# Patient Record
Sex: MALE | Race: WHITE | NOT HISPANIC OR LATINO | Employment: OTHER | URBAN - METROPOLITAN AREA
[De-identification: names, ages, dates, MRNs, and addresses within clinical notes are randomized per-mention and may not be internally consistent; named-entity substitution may affect disease eponyms.]

---

## 2017-02-24 ENCOUNTER — HOME HEALTH ADMISSION (OUTPATIENT)
Dept: HOME HEALTH SERVICES | Facility: HOME HEALTHCARE | Age: 77
End: 2017-02-24
Payer: MEDICARE

## 2017-02-28 ENCOUNTER — HOME CARE VISIT (OUTPATIENT)
Dept: HOME HEALTH SERVICES | Facility: HOME HEALTHCARE | Age: 77
End: 2017-02-28

## 2017-02-28 ENCOUNTER — HOME CARE VISIT (OUTPATIENT)
Dept: HOME HEALTH SERVICES | Facility: HOME HEALTHCARE | Age: 77
End: 2017-02-28
Payer: MEDICARE

## 2017-02-28 VITALS
HEIGHT: 64 IN | HEART RATE: 62 BPM | DIASTOLIC BLOOD PRESSURE: 78 MMHG | RESPIRATION RATE: 18 BRPM | TEMPERATURE: 99.1 F | SYSTOLIC BLOOD PRESSURE: 138 MMHG

## 2017-02-28 PROCEDURE — 665999 HH PPS REVENUE DEBIT

## 2017-02-28 PROCEDURE — G0162 HHC RN E&M PLAN SVS, 15 MIN: HCPCS

## 2017-02-28 PROCEDURE — 665001 SOC-HOME HEALTH

## 2017-02-28 PROCEDURE — 665998 HH PPS REVENUE CREDIT

## 2017-02-28 SDOH — ECONOMIC STABILITY: HOUSING INSECURITY: UNSAFE COOKING RANGE AREA: 0

## 2017-02-28 SDOH — ECONOMIC STABILITY: HOUSING INSECURITY: UNSAFE APPLIANCES: 0

## 2017-02-28 ASSESSMENT — ACTIVITIES OF DAILY LIVING (ADL)
TRANSPORTATION COMMENTS: POTENTIAL FOR FALLS
OASIS_M1830: 01
HOME_HEALTH_OASIS: 01
HOME_HEALTH_OASIS: 00

## 2017-02-28 ASSESSMENT — PATIENT HEALTH QUESTIONNAIRE - PHQ9
2. FEELING DOWN, DEPRESSED, IRRITABLE, OR HOPELESS: 00
1. LITTLE INTEREST OR PLEASURE IN DOING THINGS: 00

## 2017-03-01 PROCEDURE — 665999 HH PPS REVENUE DEBIT

## 2017-03-01 PROCEDURE — 665998 HH PPS REVENUE CREDIT

## 2017-03-02 ENCOUNTER — HOME CARE VISIT (OUTPATIENT)
Dept: HOME HEALTH SERVICES | Facility: HOME HEALTHCARE | Age: 77
End: 2017-03-02
Payer: MEDICARE

## 2017-03-02 PROCEDURE — 665999 HH PPS REVENUE DEBIT

## 2017-03-02 PROCEDURE — A6212 FOAM DRG <=16 SQ IN W/BORDER: HCPCS

## 2017-03-02 PROCEDURE — 665998 HH PPS REVENUE CREDIT

## 2017-03-02 PROCEDURE — G0493 RN CARE EA 15 MIN HH/HOSPICE: HCPCS

## 2017-03-02 PROCEDURE — A4216 STERILE WATER/SALINE, 10 ML: HCPCS

## 2017-03-02 ASSESSMENT — ENCOUNTER SYMPTOMS
VOMITING: PT DENIES
NAUSEA: PT DENIES

## 2017-03-03 ENCOUNTER — HOME CARE VISIT (OUTPATIENT)
Dept: HOME HEALTH SERVICES | Facility: HOME HEALTHCARE | Age: 77
End: 2017-03-03
Payer: MEDICARE

## 2017-03-03 VITALS
HEART RATE: 40 BPM | TEMPERATURE: 99.9 F | DIASTOLIC BLOOD PRESSURE: 60 MMHG | SYSTOLIC BLOOD PRESSURE: 120 MMHG | RESPIRATION RATE: 16 BRPM

## 2017-03-03 PROCEDURE — 665998 HH PPS REVENUE CREDIT

## 2017-03-03 PROCEDURE — 665999 HH PPS REVENUE DEBIT

## 2017-03-03 ASSESSMENT — ENCOUNTER SYMPTOMS: DEBILITATING PAIN: 1

## 2017-03-04 PROCEDURE — 665999 HH PPS REVENUE DEBIT

## 2017-03-04 PROCEDURE — 665998 HH PPS REVENUE CREDIT

## 2017-03-05 PROCEDURE — 665998 HH PPS REVENUE CREDIT

## 2017-03-05 PROCEDURE — 665999 HH PPS REVENUE DEBIT

## 2017-03-06 PROCEDURE — 665999 HH PPS REVENUE DEBIT

## 2017-03-06 PROCEDURE — 665998 HH PPS REVENUE CREDIT

## 2017-03-07 ENCOUNTER — HOME CARE VISIT (OUTPATIENT)
Dept: HOME HEALTH SERVICES | Facility: HOME HEALTHCARE | Age: 77
End: 2017-03-07
Payer: MEDICARE

## 2017-03-07 VITALS
DIASTOLIC BLOOD PRESSURE: 60 MMHG | TEMPERATURE: 98.8 F | RESPIRATION RATE: 16 BRPM | SYSTOLIC BLOOD PRESSURE: 128 MMHG | HEART RATE: 55 BPM

## 2017-03-07 PROCEDURE — 665998 HH PPS REVENUE CREDIT

## 2017-03-07 PROCEDURE — 665999 HH PPS REVENUE DEBIT

## 2017-03-07 PROCEDURE — G0162 HHC RN E&M PLAN SVS, 15 MIN: HCPCS

## 2017-03-07 ASSESSMENT — ENCOUNTER SYMPTOMS
NAUSEA: PT DENIES
VOMITING: PT DENIES

## 2017-03-08 PROCEDURE — 665998 HH PPS REVENUE CREDIT

## 2017-03-08 PROCEDURE — 665999 HH PPS REVENUE DEBIT

## 2017-03-09 PROCEDURE — 665998 HH PPS REVENUE CREDIT

## 2017-03-09 PROCEDURE — 665999 HH PPS REVENUE DEBIT

## 2017-03-10 ENCOUNTER — HOME CARE VISIT (OUTPATIENT)
Dept: HOME HEALTH SERVICES | Facility: HOME HEALTHCARE | Age: 77
End: 2017-03-10
Payer: MEDICARE

## 2017-03-10 PROCEDURE — G0151 HHCP-SERV OF PT,EA 15 MIN: HCPCS

## 2017-03-10 PROCEDURE — 665999 HH PPS REVENUE DEBIT

## 2017-03-10 PROCEDURE — 665998 HH PPS REVENUE CREDIT

## 2017-03-10 PROCEDURE — G0495 RN CARE TRAIN/EDU IN HH: HCPCS

## 2017-03-11 VITALS
SYSTOLIC BLOOD PRESSURE: 130 MMHG | HEART RATE: 81 BPM | DIASTOLIC BLOOD PRESSURE: 60 MMHG | RESPIRATION RATE: 18 BRPM | TEMPERATURE: 98.8 F

## 2017-03-11 PROCEDURE — 665999 HH PPS REVENUE DEBIT

## 2017-03-11 PROCEDURE — 665998 HH PPS REVENUE CREDIT

## 2017-03-11 SDOH — ECONOMIC STABILITY: HOUSING INSECURITY: UNSAFE APPLIANCES: 0

## 2017-03-11 SDOH — ECONOMIC STABILITY: HOUSING INSECURITY: UNSAFE COOKING RANGE AREA: 0

## 2017-03-11 ASSESSMENT — ACTIVITIES OF DAILY LIVING (ADL): ADLS_COMMENTS: <!--EPICS-->SEE OT EVAL<!--EPICE-->

## 2017-03-11 ASSESSMENT — ENCOUNTER SYMPTOMS: DEBILITATING PAIN: 1

## 2017-03-12 VITALS
SYSTOLIC BLOOD PRESSURE: 130 MMHG | HEART RATE: 81 BPM | RESPIRATION RATE: 18 BRPM | TEMPERATURE: 98.8 F | DIASTOLIC BLOOD PRESSURE: 60 MMHG

## 2017-03-12 PROCEDURE — 665999 HH PPS REVENUE DEBIT

## 2017-03-12 PROCEDURE — 665998 HH PPS REVENUE CREDIT

## 2017-03-12 ASSESSMENT — ENCOUNTER SYMPTOMS
DEBILITATING PAIN: 1
RESPIRATORY SYMPTOMS COMMENTS: NO S/S OF RESP DISTRESS

## 2017-03-13 PROCEDURE — 665999 HH PPS REVENUE DEBIT

## 2017-03-13 PROCEDURE — 665998 HH PPS REVENUE CREDIT

## 2017-03-14 ENCOUNTER — HOME CARE VISIT (OUTPATIENT)
Dept: HOME HEALTH SERVICES | Facility: HOME HEALTHCARE | Age: 77
End: 2017-03-14
Payer: MEDICARE

## 2017-03-14 VITALS
SYSTOLIC BLOOD PRESSURE: 142 MMHG | HEART RATE: 80 BPM | DIASTOLIC BLOOD PRESSURE: 62 MMHG | TEMPERATURE: 210.4 F | RESPIRATION RATE: 16 BRPM

## 2017-03-14 PROCEDURE — G0299 HHS/HOSPICE OF RN EA 15 MIN: HCPCS

## 2017-03-14 PROCEDURE — A6212 FOAM DRG <=16 SQ IN W/BORDER: HCPCS

## 2017-03-14 PROCEDURE — 665999 HH PPS REVENUE DEBIT

## 2017-03-14 PROCEDURE — 665998 HH PPS REVENUE CREDIT

## 2017-03-14 ASSESSMENT — ENCOUNTER SYMPTOMS
VOMITING: PT DENIES
DEBILITATING PAIN: 1
NAUSEA: PT DENIES

## 2017-03-15 PROCEDURE — 665999 HH PPS REVENUE DEBIT

## 2017-03-15 PROCEDURE — 665998 HH PPS REVENUE CREDIT

## 2017-03-16 PROCEDURE — 665999 HH PPS REVENUE DEBIT

## 2017-03-16 PROCEDURE — 665998 HH PPS REVENUE CREDIT

## 2017-03-17 ENCOUNTER — HOME CARE VISIT (OUTPATIENT)
Dept: HOME HEALTH SERVICES | Facility: HOME HEALTHCARE | Age: 77
End: 2017-03-17
Payer: MEDICARE

## 2017-03-17 PROCEDURE — 665998 HH PPS REVENUE CREDIT

## 2017-03-17 PROCEDURE — G0299 HHS/HOSPICE OF RN EA 15 MIN: HCPCS

## 2017-03-17 PROCEDURE — 665999 HH PPS REVENUE DEBIT

## 2017-03-18 PROCEDURE — 665998 HH PPS REVENUE CREDIT

## 2017-03-18 PROCEDURE — 665999 HH PPS REVENUE DEBIT

## 2017-03-19 VITALS
DIASTOLIC BLOOD PRESSURE: 70 MMHG | SYSTOLIC BLOOD PRESSURE: 110 MMHG | TEMPERATURE: 98.3 F | RESPIRATION RATE: 18 BRPM | HEART RATE: 76 BPM

## 2017-03-19 PROCEDURE — 665999 HH PPS REVENUE DEBIT

## 2017-03-19 PROCEDURE — 665998 HH PPS REVENUE CREDIT

## 2017-03-19 ASSESSMENT — ENCOUNTER SYMPTOMS
RESPIRATORY SYMPTOMS COMMENTS: NO S/S OF RESP DISTRESS
ADDITIONAL INFORMATION: BACK SURGERY
DEBILITATING PAIN: 1

## 2017-03-20 PROCEDURE — 665998 HH PPS REVENUE CREDIT

## 2017-03-20 PROCEDURE — 665999 HH PPS REVENUE DEBIT

## 2017-03-21 ENCOUNTER — OFFICE VISIT (OUTPATIENT)
Dept: CARDIOLOGY | Facility: MEDICAL CENTER | Age: 77
End: 2017-03-21
Payer: MEDICARE

## 2017-03-21 VITALS
BODY MASS INDEX: 18.61 KG/M2 | SYSTOLIC BLOOD PRESSURE: 136 MMHG | HEART RATE: 76 BPM | HEIGHT: 64 IN | OXYGEN SATURATION: 84 % | WEIGHT: 109 LBS | DIASTOLIC BLOOD PRESSURE: 70 MMHG

## 2017-03-21 DIAGNOSIS — R55 NEAR SYNCOPE: ICD-10-CM

## 2017-03-21 DIAGNOSIS — E78.5 HYPERLIPIDEMIA, UNSPECIFIED HYPERLIPIDEMIA TYPE: ICD-10-CM

## 2017-03-21 DIAGNOSIS — I26.99 OTHER PULMONARY EMBOLISM WITHOUT ACUTE COR PULMONALE, UNSPECIFIED CHRONICITY (HCC): ICD-10-CM

## 2017-03-21 LAB — EKG IMPRESSION: NORMAL

## 2017-03-21 PROCEDURE — G8419 CALC BMI OUT NRM PARAM NOF/U: HCPCS | Performed by: INTERNAL MEDICINE

## 2017-03-21 PROCEDURE — 1036F TOBACCO NON-USER: CPT | Performed by: INTERNAL MEDICINE

## 2017-03-21 PROCEDURE — 4040F PNEUMOC VAC/ADMIN/RCVD: CPT | Mod: 8P | Performed by: INTERNAL MEDICINE

## 2017-03-21 PROCEDURE — 1101F PT FALLS ASSESS-DOCD LE1/YR: CPT | Mod: 8P | Performed by: INTERNAL MEDICINE

## 2017-03-21 PROCEDURE — G8484 FLU IMMUNIZE NO ADMIN: HCPCS | Performed by: INTERNAL MEDICINE

## 2017-03-21 PROCEDURE — G8432 DEP SCR NOT DOC, RNG: HCPCS | Performed by: INTERNAL MEDICINE

## 2017-03-21 PROCEDURE — 665999 HH PPS REVENUE DEBIT

## 2017-03-21 PROCEDURE — 99204 OFFICE O/P NEW MOD 45 MIN: CPT | Performed by: INTERNAL MEDICINE

## 2017-03-21 PROCEDURE — 3017F COLORECTAL CA SCREEN DOC REV: CPT | Mod: 8P | Performed by: INTERNAL MEDICINE

## 2017-03-21 PROCEDURE — 93000 ELECTROCARDIOGRAM COMPLETE: CPT | Performed by: INTERNAL MEDICINE

## 2017-03-21 PROCEDURE — 665998 HH PPS REVENUE CREDIT

## 2017-03-21 RX ORDER — ATORVASTATIN CALCIUM 20 MG/1
20 TABLET, FILM COATED ORAL NIGHTLY
COMMUNITY

## 2017-03-21 RX ORDER — TIOTROPIUM BROMIDE 18 UG/1
18 CAPSULE ORAL; RESPIRATORY (INHALATION) DAILY
COMMUNITY

## 2017-03-21 ASSESSMENT — ENCOUNTER SYMPTOMS
HEADACHES: 0
ABDOMINAL PAIN: 0
FALLS: 0
PALPITATIONS: 1
SHORTNESS OF BREATH: 1
ORTHOPNEA: 0
DEPRESSION: 0
DIZZINESS: 1
LOSS OF CONSCIOUSNESS: 0
BRUISES/BLEEDS EASILY: 0
PND: 0

## 2017-03-21 NOTE — Clinical Note
[facility]   [address] [place] [address]  ANGELIKA Berger [address]  Phone: [phone]  Fax: [phone]              Dear Dr. Raymundo,     It was a pleasure to see your patient in Cardiology clinic. Please see my note from today attached below.     Sincerely,    Nikki Morataya MD      Dewey Burton  1940    Encounter Date: 3/21/2017    PROGRESS NOTE:  Subjective:   Dewey Burton is a 76 y.o. male with past medical history significant for hyperlipidemia who was recently admitted to [facility] and diagnosed with acute PE was referred to cardiology clinic due to ongoing presyncope.    Records from recent hospital admission at [facility]  were reviewed. Patient admitted for near syncope versus syncopal episode. He was down for a few hours before he went to the hospital. He was found to be in rhabdomyolysis. He was also found to have acute PE. Patient was also hypoxemic on admission however that spontaneously resolved. Workup included an echocardiogram and a carotid duplex which is detailed below. Also had mildly elevated troponin thought to be demand ischemia in the setting of acute PE.    Since his discharge patient states that he's had 3 other episodes of near syncope. His wife notes that he has been very dehydrated and does not drink sufficient fluids. Also of note he was recently started on Lasix because of lower extremity edema after his recent hospitalization. His lower extremity edema has now resolved. At baseline he has dyspnea on exertion with walking up one flight of stairs which is stable for him. He reports occasional palpitations about a couple times a month not associated with his dizziness. He denies any orthopnea or PND.    Past medical history  Hyperlipidemia  Acute PE  Mild carotid stenosis    History reviewed. No pertinent past surgical history.     History reviewed. No pertinent family history.  History   Smoking status   • Former Smoker -- 1.50  packs/day   • Types: Cigarettes   • Quit date: 12/01/2012   Smokeless tobacco   • Never Used     Patient smoked 1-1/2 packs per day for 58 years, quit 2012. Drinks about 3 cans of beer per day. No history of recreational drug use.    Allergies   Allergen Reactions   • Lorazepam      STATES PARANOIA, HALLUCINATIONS     Outpatient Encounter Prescriptions as of 3/21/2017   Medication Sig Dispense Refill   • atorvastatin (LIPITOR) 20 MG Tab Take 20 mg by mouth every evening.     • tiotropium (SPIRIVA) 18 MCG Cap Inhale 18 mcg by mouth every day.     • Chlorphen-Phenyleph-Ibuprofen (ADVIL ALLERGY & CONGESTION PO) Take 1 tablet by mouth 1 time daily as needed. congestion     • Oxycodone HCl 20 MG Tab Take 20 mg by mouth 5 Times a Day. pain     • apixaban (ELIQUIS) 5mg Tab Take 5 mg by mouth 2 Times a Day.     • meloxicam (MOBIC) 15 MG tablet Take 15 mg by mouth 1 time daily as needed for Moderate Pain.     • cetirizine (ZYRTEC) 10 MG Tab Take 10 mg by mouth every day.     • aspirin 81 MG tablet Take 81 mg by mouth every day.     • Ibuprofen-Diphenhydramine Cit (ADVIL PM) 200-38 MG Tab Take 3 Tabs by mouth every bedtime.     • omeprazole (PRILOSEC) 20 MG delayed-release capsule Take 20 mg by mouth 2 times a day.     • docusate sodium (COLACE) 100 MG Cap Take 100 mg by mouth 2 times a day. stool softener     • apixaban (ELIQUIS) 5mg Tab Take 5 mg by mouth 2 Times a Day.     • silver sulfADIAZINE (SSD) 1 % Cream Apply  to affected area(s) 2 Times a Day. to wounds on buttocks     • [DISCONTINUED] furosemide (LASIX) 20 MG Tab Take 20 mg by mouth every day.     • Oxycodone HCl 20 MG Tab Take 1 Tab by mouth 4 times a day as needed (PAIN).     • ondansetron (ZOFRAN) 4 MG Tab tablet Take 4 mg by mouth every 6 hours as needed for Nausea/Vomiting.     • tizanidine (ZANAFLEX) 6 MG capsule Take 6 mg by mouth 2 Times a Day.     • gabapentin (NEURONTIN) 300 MG Cap Take 300-600 mg by mouth 3 times a day.     • meloxicam (MOBIC) 7.5 MG  "Tab Take 7.5 mg by mouth every day.     • Oxycodone-Acetaminophen (PERCOCET-10)  MG Tab Take 1-2 Tabs by mouth every four hours as needed for Severe Pain.     • Dietary Management Product (GABADONE PO) Take  by mouth.     • ATORVASTATIN CALCIUM PO Take 20 mg by mouth every day.     • Tiotropium Bromide Monohydrate (SPIRIVA HANDIHALER INH) Inhale  by mouth.       No facility-administered encounter medications on file as of 3/21/2017.     Review of Systems   Constitutional: Negative for malaise/fatigue.   Respiratory: Positive for shortness of breath (with exertion).    Cardiovascular: Positive for palpitations. Negative for chest pain, orthopnea, leg swelling and PND.   Gastrointestinal: Negative for abdominal pain.   Musculoskeletal: Negative for falls.   Skin: Negative.    Neurological: Positive for dizziness. Negative for loss of consciousness and headaches.   Endo/Heme/Allergies: Does not bruise/bleed easily.   Psychiatric/Behavioral: Negative for depression.   All other systems reviewed and are negative.       Objective:   /70 mmHg  Pulse 76  Ht 1.626 m (5' 4.02\")  Wt 49.442 kg (109 lb)  BMI 18.70 kg/m2  SpO2 84%    Physical Exam   Constitutional: He is oriented to person, place, and time. No distress.   HENT:   Head: Normocephalic and atraumatic.   Eyes: Conjunctivae are normal.   Neck: Normal range of motion. Neck supple. No JVD present.   Cardiovascular: Normal rate, regular rhythm and normal heart sounds.  Exam reveals no gallop and no friction rub.    No murmur heard.  Pulmonary/Chest: Effort normal and breath sounds normal. No respiratory distress. He has no wheezes. He has no rales.   Abdominal: Soft. There is no tenderness.   Musculoskeletal: He exhibits no edema.   Neurological: He is alert and oriented to person, place, and time.   Skin: Skin is warm and dry. He is not diaphoretic.   Psychiatric: He has a normal mood and affect.   Nursing note and vitals reviewed.    Labs from . " [facility] reviewed. Initially CK was significantly elevated however close to normal at the time of discharge. Creatinine normal at the time of discharge.    Cardiac duplex report from [facility] reviewed. It showed mild atherosclerotic disease bilaterally.    Echocardiogram from [facility] during recent admission also reviewed. It showed an ejection fraction of 60-65% with mild diastolic dysfunction. No major valvular pathology noted.    ECG from today was reviewed. It showed normal sinus rhythm at 65 bpm, normal axis, normal intervals, no Q waves, no ST-T wave changes. ECG unchanged from baseline.      Assessment:     1. Near syncope  HOLTER MONITOR / EVENT RECORDER    EKG    EKG       Medical Decision Making:  Today's Assessment / Status / Plan:     Near syncope  Occasional palpitations    Patient's cardiac workup at [facility] was relatively unremarkable. He continues to have ongoing near syncope. I suspect this is likely related to the recent diagnosis of PE however SVT cannot be ruled out. I will proceed with obtaining an event monitor for further evaluation. In the meantime I have advised the patient to ensure that he is hydrated. I have discontinued his Lasix as I think his lower extremity edema was essentially dependent. I have also advised the patient to cut down on his alcohol intake to no more than one drink per day.    Lower extremity edema - likely dependent. Echocardiogram was relatively unremarkable with mild diastolic dysfunction. Patient was advised to elevate his legs as much as possible. And use compression stockings as needed.    Return to clinic in 3 months or prn.    Thank you for allowing me to participate in the care of this patient. Please do not hesitate to contact me with any questions.    Nikki Morataya MD  Cardiologist  [facility]      Basilio Raymundo M.D.  [address] [place] [address] [address]  [address]  [facility] [address]  VIA Facsimile: [phone]

## 2017-03-21 NOTE — MR AVS SNAPSHOT
"Dewey Burton   3/21/2017 1:20 PM   Office Visit   MRN: [id number]    Department:  [facility]   Dept Phone:  [phone]    Description:  Male : 1940   Provider:  Nikki Morataya M.D.           Reason for Visit     New Patient           Allergies as of 3/21/2017     Allergen Noted Reactions    Lorazepam 2017       STATES PARANOIA, HALLUCINATIONS      You were diagnosed with     Near syncope   [569438]         Vital Signs     Blood Pressure Pulse Height Weight Body Mass Index Oxygen Saturation    136/70 mmHg 76 1.626 m (5' 4.02\") 49.442 kg (109 lb) 18.70 kg/m2 84%    Smoking Status                   Former Smoker           Basic Information     Date Of Birth Sex Race Ethnicity Preferred Language    1940 Male White Non- English      Your appointments     Mar 22, 2017 To Be Determined   SN-HH-HOME VISIT with Melony De Jesus L.P.N.   [facility] (--)    [address]  [place] [address]   [phone]            Mar 29, 2017 To Be Determined   SN-HH-HOME VISIT with Madelyn Viera R.N.   [facility] (--)    [address]  [place] [address]   [phone]            Mar 30, 2017  7:30 AM   Wound New Evaluation with Vijaya Rutledge P.T.   [facility] ([address] [place] [address])    [address]  [place] [address]   [phone]            2017 To Be Determined   SN-HH-HOME VISIT with Madelyn Viera R.N.   [facility] (--)    [address]  [place] [address]   [phone]            2017 To Be Determined   SN-HH-HOME VISIT with Madelyn Viera R.N.   [facility] (--)    [address]  [place] [address]   [phone]            2017 To Be Determined   SN-HH-HOME VISIT with Madelyn Viera R.N.   [facility] (--)    [address]  [place] [address]   [phone]            2017 To Be Determined   SN-HH-HOME VISIT with Madelyn Viera R.N.   [facility] (--)    [address]  [place] [address]   [phone]              Health Maintenance     Patient has no pending health maintenance at this time      Results       Current Immunizations     No immunizations on file.      Below and/or attached are the medications your provider expects you to take. Review all of your home medications and newly ordered medications with your provider and/or pharmacist. Follow medication instructions as directed by your provider and/or pharmacist. Please keep your medication list with you and share with your provider. Update the information when medications are discontinued, doses are changed, or new medications (including over-the-counter products) are added; and carry medication information at all times in the event of emergency situations     Allergies:  LORAZEPAM - (reactions not documented)               Medications  Valid as of: March 21, 2017 -  2:39 PM    Generic Name Brand Name Tablet Size Instructions for use    Apixaban (Tab) ELIQUIS 5mg Take 5 mg by mouth 2 Times a Day.        Apixaban (Tab) ELIQUIS 5mg Take 5 mg by mouth 2 Times a Day.        Aspirin (Tab) aspirin 81 MG Take 81 mg by mouth every day.        Atorvastatin Calcium   Take 20 mg by mouth every day.        Atorvastatin Calcium (Tab) LIPITOR 20 MG Take 20 mg by mouth every evening.        Cetirizine HCl (Tab) ZYRTEC 10 MG Take 10 mg by mouth every day.        Chlorphen-Phenyleph-Ibuprofen   Take 1 tablet by mouth 1 time daily as needed. congestion        Dietary Management Product   Take  by mouth.        Docusate Sodium (Cap) COLACE 100 MG Take 100 mg by mouth 2 times a day. stool softener        Gabapentin (Cap) NEURONTIN 300 MG Take 300-600 mg by mouth 3 times a day.        Ibuprofen-Diphenhydramine Cit (Tab) Ibuprofen-Diphenhydramine Cit 200-38 MG Take 3 Tabs by mouth every bedtime.        Meloxicam (Tab) MOBIC 7.5 MG Take 7.5 mg by mouth every day.        Meloxicam (Tab) MOBIC 15 MG Take 15 mg by mouth 1 time daily as  needed for Moderate Pain.        Omeprazole (CAPSULE DELAYED RELEASE) PRILOSEC 20 MG Take 20 mg by mouth 2 times a day.        Ondansetron HCl (Tab) ZOFRAN 4 MG Take 4 mg by mouth every 6 hours as needed for Nausea/Vomiting.        OxyCODONE HCl (Tab) Oxycodone HCl 20 MG Take 1 Tab by mouth 4 times a day as needed (PAIN).        OxyCODONE HCl (Tab) Oxycodone HCl 20 MG Take 20 mg by mouth 5 Times a Day. pain        Oxycodone-Acetaminophen (Tab) PERCOCET-10  MG Take 1-2 Tabs by mouth every four hours as needed for Severe Pain.        Silver Sulfadiazine (Cream) SILVADENE 1 % Apply  to affected area(s) 2 Times a Day. to wounds on buttocks        Tiotropium Bromide Monohydrate   Inhale  by mouth.        Tiotropium Bromide Monohydrate (Cap) SPIRIVA 18 MCG Inhale 18 mcg by mouth every day.        TiZANidine HCl (Cap) ZANAFLEX 6 MG Take 6 mg by mouth 2 Times a Day.        .                 Medicines prescribed today were sent to:     [facility] [address] [place] [address] - [facility] & [address] [address]    Phone: [phone] Fax: [phone]    Open 24 Hours?: No      Medication refill instructions:       If your prescription bottle indicates you have medication refills left, it is not necessary to call your provider’s office. Please contact your pharmacy and they will refill your medication.    If your prescription bottle indicates you do not have any refills left, you may request refills at any time through one of the following ways: The online [employer] system (except Urgent Care), by calling your provider’s office, or by asking your pharmacy to contact your provider’s office with a refill request. Medication refills are processed only during regular business hours and may not be available until the next business day. Your provider may request additional information or to have a follow-up visit with you prior to refilling your medication.   *Please Note:  Medication refills are assigned a new Rx number when refilled electronically. Your pharmacy may indicate that no refills were authorized even though a new prescription for the same medication is available at the pharmacy. Please request the medicine by name with the pharmacy before contacting your provider for a refill.        Your To Do List     Future Labs/Procedures Complete By Expires    EKG  As directed 3/21/2018    HOLTER MONITOR / EVENT RECORDER  As directed 3/21/2018         Wellness[employer] Access Code: [email or web address]  Expires: 4/7/2017  1:21 PM    WellnessFX  A secure, online tool to manage your health information     [employer] WellnessFX® is a secure, online tool that connects you to your personalized health information from the privacy of your home -- day or night - making it very easy for you to manage your healthcare. Once the activation process is completed, you can even access your medical information using the WellnessFX pooja, which is available for free in the Apple Pooja store or Google Play store.     WellnessFX provides the following levels of access (as shown below):   My Chart Features   [facility] Doctor [facility]  Specialists [facility]  Urgent  Care Non-[facility]  Primary Care  Doctor   Email your healthcare team securely and privately 24/7 X X X    Manage appointments: schedule your next appointment; view details of past/upcoming appointments X      Request prescription refills. X      View recent personal medical records, including lab and immunizations X X X X   View health record, including health history, allergies, medications X X X X   Read reports about your outpatient visits, procedures, consult and ER notes X X X X   See your discharge summary, which is a recap of your hospital and/or ER visit that includes your diagnosis, lab results, and care plan. X X       How to register for WellnessFX:  1. Go to  [email or web address].  2. Click on the Sign Up Now box, which takes you to the New Member  Sign Up page. You will need to provide the following information:  a. Enter your [employer] Access Code exactly as it appears at the top of this page. (You will not need to use this code after you’ve completed the sign-up process. If you do not sign up before the expiration date, you must request a new code.)   b. Enter your date of birth.   c. Enter your home email address.   d. Click Submit, and follow the next screen’s instructions.  3. Create a [employer] ID. This will be your [employer] login ID and cannot be changed, so think of one that is secure and easy to remember.  4. Create a [employer] password. You can change your password at any time.  5. Enter your Password Reset Question and Answer. This can be used at a later time if you forget your password.   6. Enter your e-mail address. This allows you to receive e-mail notifications when new information is available in [employer].  7. Click Sign Up. You can now view your health information.    For assistance activating your [employer] account, call [phone]

## 2017-03-22 ENCOUNTER — OFFICE VISIT (OUTPATIENT)
Dept: WOUND CARE | Facility: MEDICAL CENTER | Age: 77
End: 2017-03-22
Attending: FAMILY MEDICINE
Payer: MEDICARE

## 2017-03-22 ENCOUNTER — HOME CARE VISIT (OUTPATIENT)
Dept: HOME HEALTH SERVICES | Facility: HOME HEALTHCARE | Age: 77
End: 2017-03-22
Payer: MEDICARE

## 2017-03-22 DIAGNOSIS — T14.8XXA NON-HEALING NON-SURGICAL WOUND: ICD-10-CM

## 2017-03-22 DIAGNOSIS — T30.0 BURN: ICD-10-CM

## 2017-03-22 PROCEDURE — G0496 LPN CARE TRAIN/EDU IN HH: HCPCS

## 2017-03-22 PROCEDURE — A6258 TRANSPARENT FILM >16<=48 IN: HCPCS

## 2017-03-22 PROCEDURE — A6402 STERILE GAUZE <= 16 SQ IN: HCPCS

## 2017-03-22 PROCEDURE — 16020 DRESS/DEBRID P-THICK BURN S: CPT

## 2017-03-22 PROCEDURE — 665998 HH PPS REVENUE CREDIT

## 2017-03-22 PROCEDURE — A6235 HYDROCOLLD DRG >16<=48 W/O B: HCPCS

## 2017-03-22 PROCEDURE — 665999 HH PPS REVENUE DEBIT

## 2017-03-22 PROCEDURE — 11042 DBRDMT SUBQ TIS 1ST 20SQCM/<: CPT | Performed by: FAMILY MEDICINE

## 2017-03-22 SDOH — ECONOMIC STABILITY: HOUSING INSECURITY: UNSAFE COOKING RANGE AREA: 0

## 2017-03-22 SDOH — ECONOMIC STABILITY: HOUSING INSECURITY: UNSAFE APPLIANCES: 0

## 2017-03-22 ASSESSMENT — ENCOUNTER SYMPTOMS: RESPIRATORY SYMPTOMS COMMENTS: PT LUNGS CLEAR IN ALL FIELDS, NO ADVANTAGEOUS SOUND NOTED.

## 2017-03-22 NOTE — PROGRESS NOTES
Subjective:   Dewey Burton is a 76 y.o. male with past medical history significant for hyperlipidemia who was recently admitted to [facility] and diagnosed with acute PE was referred to cardiology clinic due to ongoing presyncope.    Records from recent hospital admission at [facility]  were reviewed. Patient admitted for near syncope versus syncopal episode. He was down for a few hours before he went to the hospital. He was found to be in rhabdomyolysis. He was also found to have acute PE. Patient was also hypoxemic on admission however that spontaneously resolved. Workup included an echocardiogram and a carotid duplex which is detailed below. Also had mildly elevated troponin thought to be demand ischemia in the setting of acute PE.    Since his discharge patient states that he's had 3 other episodes of near syncope. His wife notes that he has been very dehydrated and does not drink sufficient fluids. Also of note he was recently started on Lasix because of lower extremity edema after his recent hospitalization. His lower extremity edema has now resolved. At baseline he has dyspnea on exertion with walking up one flight of stairs which is stable for him. He reports occasional palpitations about a couple times a month not associated with his dizziness. He denies any orthopnea or PND.    Past medical history  Hyperlipidemia  Acute PE  Mild carotid stenosis    History reviewed. No pertinent past surgical history.     History reviewed. No pertinent family history.  History   Smoking status   • Former Smoker -- 1.50 packs/day   • Types: Cigarettes   • Quit date: 12/01/2012   Smokeless tobacco   • Never Used     Patient smoked 1-1/2 packs per day for 58 years, quit 2012. Drinks about 3 cans of beer per day. No history of recreational drug use.    Allergies   Allergen Reactions   • Lorazepam      STATES PARANOIA, HALLUCINATIONS     Outpatient Encounter Prescriptions as of 3/21/2017   Medication Sig Dispense Refill   •  atorvastatin (LIPITOR) 20 MG Tab Take 20 mg by mouth every evening.     • tiotropium (SPIRIVA) 18 MCG Cap Inhale 18 mcg by mouth every day.     • Chlorphen-Phenyleph-Ibuprofen (ADVIL ALLERGY & CONGESTION PO) Take 1 tablet by mouth 1 time daily as needed. congestion     • Oxycodone HCl 20 MG Tab Take 20 mg by mouth 5 Times a Day. pain     • apixaban (ELIQUIS) 5mg Tab Take 5 mg by mouth 2 Times a Day.     • meloxicam (MOBIC) 15 MG tablet Take 15 mg by mouth 1 time daily as needed for Moderate Pain.     • cetirizine (ZYRTEC) 10 MG Tab Take 10 mg by mouth every day.     • aspirin 81 MG tablet Take 81 mg by mouth every day.     • Ibuprofen-Diphenhydramine Cit (ADVIL PM) 200-38 MG Tab Take 3 Tabs by mouth every bedtime.     • omeprazole (PRILOSEC) 20 MG delayed-release capsule Take 20 mg by mouth 2 times a day.     • docusate sodium (COLACE) 100 MG Cap Take 100 mg by mouth 2 times a day. stool softener     • apixaban (ELIQUIS) 5mg Tab Take 5 mg by mouth 2 Times a Day.     • silver sulfADIAZINE (SSD) 1 % Cream Apply  to affected area(s) 2 Times a Day. to wounds on buttocks     • [DISCONTINUED] furosemide (LASIX) 20 MG Tab Take 20 mg by mouth every day.     • Oxycodone HCl 20 MG Tab Take 1 Tab by mouth 4 times a day as needed (PAIN).     • ondansetron (ZOFRAN) 4 MG Tab tablet Take 4 mg by mouth every 6 hours as needed for Nausea/Vomiting.     • tizanidine (ZANAFLEX) 6 MG capsule Take 6 mg by mouth 2 Times a Day.     • gabapentin (NEURONTIN) 300 MG Cap Take 300-600 mg by mouth 3 times a day.     • meloxicam (MOBIC) 7.5 MG Tab Take 7.5 mg by mouth every day.     • Oxycodone-Acetaminophen (PERCOCET-10)  MG Tab Take 1-2 Tabs by mouth every four hours as needed for Severe Pain.     • Dietary Management Product (GABADONE PO) Take  by mouth.     • ATORVASTATIN CALCIUM PO Take 20 mg by mouth every day.     • Tiotropium Bromide Monohydrate (SPIRIVA HANDIHALER INH) Inhale  by mouth.       No facility-administered encounter  "medications on file as of 3/21/2017.     Review of Systems   Constitutional: Negative for malaise/fatigue.   Respiratory: Positive for shortness of breath (with exertion).    Cardiovascular: Positive for palpitations. Negative for chest pain, orthopnea, leg swelling and PND.   Gastrointestinal: Negative for abdominal pain.   Musculoskeletal: Negative for falls.   Skin: Negative.    Neurological: Positive for dizziness. Negative for loss of consciousness and headaches.   Endo/Heme/Allergies: Does not bruise/bleed easily.   Psychiatric/Behavioral: Negative for depression.   All other systems reviewed and are negative.       Objective:   /70 mmHg  Pulse 76  Ht 1.626 m (5' 4.02\")  Wt 49.442 kg (109 lb)  BMI 18.70 kg/m2  SpO2 84%    Physical Exam   Constitutional: He is oriented to person, place, and time. No distress.   HENT:   Head: Normocephalic and atraumatic.   Eyes: Conjunctivae are normal.   Neck: Normal range of motion. Neck supple. No JVD present.   Cardiovascular: Normal rate, regular rhythm and normal heart sounds.  Exam reveals no gallop and no friction rub.    No murmur heard.  Pulmonary/Chest: Effort normal and breath sounds normal. No respiratory distress. He has no wheezes. He has no rales.   Abdominal: Soft. There is no tenderness.   Musculoskeletal: He exhibits no edema.   Neurological: He is alert and oriented to person, place, and time.   Skin: Skin is warm and dry. He is not diaphoretic.   Psychiatric: He has a normal mood and affect.   Nursing note and vitals reviewed.    Labs from [facility] reviewed. Initially CK was significantly elevated however close to normal at the time of discharge. Creatinine normal at the time of discharge.    Cardiac duplex report from [facility] reviewed. It showed mild atherosclerotic disease bilaterally.    Echocardiogram from [facility] during recent admission also reviewed. It showed an ejection fraction of 60-65% with mild diastolic dysfunction. No major " valvular pathology noted.    ECG from today was reviewed. It showed normal sinus rhythm at 65 bpm, normal axis, normal intervals, no Q waves, no ST-T wave changes. ECG unchanged from baseline.      Assessment:     1. Near syncope  HOLTER MONITOR / EVENT RECORDER    EKG    EKG       Medical Decision Making:  Today's Assessment / Status / Plan:     Near syncope  Occasional palpitations    Patient's cardiac workup at [facility] was relatively unremarkable. He continues to have ongoing near syncope. I suspect this is likely related to the recent diagnosis of PE however SVT cannot be ruled out. I will proceed with obtaining an event monitor for further evaluation. In the meantime I have advised the patient to ensure that he is hydrated. I have discontinued his Lasix as I think his lower extremity edema was essentially dependent. I have also advised the patient to cut down on his alcohol intake to no more than one drink per day.    Lower extremity edema - likely dependent. Echocardiogram was relatively unremarkable with mild diastolic dysfunction. Patient was advised to elevate his legs as much as possible. And use compression stockings as needed.    Return to clinic in 3 months or prn.    Thank you for allowing me to participate in the care of this patient. Please do not hesitate to contact me with any questions.    Nikki Morataya MD  Cardiologist  [facility]
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
## 2017-03-23 VITALS
SYSTOLIC BLOOD PRESSURE: 126 MMHG | HEART RATE: 79 BPM | TEMPERATURE: 98.6 F | RESPIRATION RATE: 16 BRPM | DIASTOLIC BLOOD PRESSURE: 66 MMHG

## 2017-03-23 PROCEDURE — 665998 HH PPS REVENUE CREDIT

## 2017-03-23 PROCEDURE — 665999 HH PPS REVENUE DEBIT

## 2017-03-24 ENCOUNTER — HOME CARE VISIT (OUTPATIENT)
Dept: HOME HEALTH SERVICES | Facility: HOME HEALTHCARE | Age: 77
End: 2017-03-24
Payer: MEDICARE

## 2017-03-24 PROCEDURE — 665998 HH PPS REVENUE CREDIT

## 2017-03-24 PROCEDURE — 665999 HH PPS REVENUE DEBIT

## 2017-03-25 PROCEDURE — 665999 HH PPS REVENUE DEBIT

## 2017-03-25 PROCEDURE — 665998 HH PPS REVENUE CREDIT

## 2017-03-26 PROCEDURE — 665999 HH PPS REVENUE DEBIT

## 2017-03-26 PROCEDURE — 665998 HH PPS REVENUE CREDIT

## 2017-03-27 PROBLEM — T30.0 BURN: Status: ACTIVE | Noted: 2017-03-27

## 2017-03-27 PROBLEM — T14.8XXA NON-HEALING NON-SURGICAL WOUND: Status: ACTIVE | Noted: 2017-03-27

## 2017-03-27 PROCEDURE — 665998 HH PPS REVENUE CREDIT

## 2017-03-27 PROCEDURE — 665999 HH PPS REVENUE DEBIT

## 2017-03-27 NOTE — PROGRESS NOTES
Pt is a 75 yo male who presents with bilateral buttocks wounds.   Asked to see for debridement of eschar.    Wound measurement:   1) 1.5 x 2.0   2) 1.5 x 4.0   3) 1.0 x 0.5    DESCRIPTION OF PROCEDURE: Selective debridement of eschar to remove necrotic tissue and enhance wound healing.    ANESTHESIA: Topical lidocaine    DESCRIPTION OF PROCEDURE:   Using a 15 blade scapel and tweezer as much eschar as possible was removed from the wound bed.    The remaining eschar was cross hatched and medihoney was applied to the wound bed to expediate autolytic debridement.    Post care instructions were given to the patient.   He was advised to seek medical attention if he develops fever, chills, or tremendous pain at the wound site.    No apparent complications noted.  Will return to the clinic next week for further debridement and wound care.

## 2017-03-28 ENCOUNTER — OFFICE VISIT (OUTPATIENT)
Dept: WOUND CARE | Facility: MEDICAL CENTER | Age: 77
End: 2017-03-28
Attending: FAMILY MEDICINE
Payer: MEDICARE

## 2017-03-28 ENCOUNTER — NON-PROVIDER VISIT (OUTPATIENT)
Dept: CARDIOLOGY | Facility: MEDICAL CENTER | Age: 77
End: 2017-03-28
Attending: INTERNAL MEDICINE
Payer: MEDICARE

## 2017-03-28 DIAGNOSIS — I47.10 SVT (SUPRAVENTRICULAR TACHYCARDIA): ICD-10-CM

## 2017-03-28 DIAGNOSIS — T14.8XXA NON-HEALING NON-SURGICAL WOUND: ICD-10-CM

## 2017-03-28 DIAGNOSIS — T30.0 BURN: ICD-10-CM

## 2017-03-28 DIAGNOSIS — R55 NEAR SYNCOPE: ICD-10-CM

## 2017-03-28 PROCEDURE — 665998 HH PPS REVENUE CREDIT

## 2017-03-28 PROCEDURE — 11043 DBRDMT MUSC&/FSCA 1ST 20/<: CPT

## 2017-03-28 PROCEDURE — 665999 HH PPS REVENUE DEBIT

## 2017-03-28 PROCEDURE — 16020 DRESS/DEBRID P-THICK BURN S: CPT | Performed by: FAMILY MEDICINE

## 2017-03-28 PROCEDURE — A6402 STERILE GAUZE <= 16 SQ IN: HCPCS

## 2017-03-28 PROCEDURE — 15271 SKIN SUB GRAFT TRNK/ARM/LEG: CPT | Performed by: FAMILY MEDICINE
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
## 2017-03-29 ENCOUNTER — TELEPHONE (OUTPATIENT)
Dept: CARDIOLOGY | Facility: MEDICAL CENTER | Age: 77
End: 2017-03-29

## 2017-03-29 PROCEDURE — 665999 HH PPS REVENUE DEBIT

## 2017-03-29 PROCEDURE — 665998 HH PPS REVENUE CREDIT

## 2017-03-30 ENCOUNTER — HOME CARE VISIT (OUTPATIENT)
Dept: HOME HEALTH SERVICES | Facility: HOME HEALTHCARE | Age: 77
End: 2017-03-30
Payer: MEDICARE

## 2017-03-30 VITALS
TEMPERATURE: 99 F | SYSTOLIC BLOOD PRESSURE: 128 MMHG | HEART RATE: 68 BPM | RESPIRATION RATE: 14 BRPM | DIASTOLIC BLOOD PRESSURE: 70 MMHG

## 2017-03-30 PROCEDURE — 665998 HH PPS REVENUE CREDIT

## 2017-03-30 PROCEDURE — G0162 HHC RN E&M PLAN SVS, 15 MIN: HCPCS

## 2017-03-30 PROCEDURE — 665999 HH PPS REVENUE DEBIT

## 2017-03-30 SDOH — ECONOMIC STABILITY: HOUSING INSECURITY: UNSAFE COOKING RANGE AREA: 0

## 2017-03-30 SDOH — ECONOMIC STABILITY: HOUSING INSECURITY: UNSAFE APPLIANCES: 0

## 2017-03-30 ASSESSMENT — ACTIVITIES OF DAILY LIVING (ADL)
OASIS_M1830: 01
HOME_HEALTH_OASIS: 00
HOME_HEALTH_OASIS: 01

## 2017-03-30 ASSESSMENT — ENCOUNTER SYMPTOMS
DEBILITATING PAIN: 1
ADDITIONAL INFORMATION: CHRONIC BACK PAIN

## 2017-03-31 ENCOUNTER — NON-PROVIDER VISIT (OUTPATIENT)
Dept: WOUND CARE | Facility: MEDICAL CENTER | Age: 77
End: 2017-03-31
Attending: FAMILY MEDICINE
Payer: MEDICARE

## 2017-03-31 PROCEDURE — A6402 STERILE GAUZE <= 16 SQ IN: HCPCS

## 2017-03-31 PROCEDURE — A6212 FOAM DRG <=16 SQ IN W/BORDER: HCPCS

## 2017-03-31 PROCEDURE — 665998 HH PPS REVENUE CREDIT

## 2017-03-31 PROCEDURE — 16020 DRESS/DEBRID P-THICK BURN S: CPT

## 2017-03-31 PROCEDURE — 303972 HCHG HYPAFIX RET DRST 18SQ PC"

## 2017-03-31 PROCEDURE — A6235 HYDROCOLLD DRG >16<=48 W/O B: HCPCS

## 2017-03-31 PROCEDURE — A6213 FOAM DRG >16<=48 SQ IN W/BDR: HCPCS

## 2017-03-31 PROCEDURE — 97602 WOUND(S) CARE NON-SELECTIVE: CPT

## 2017-03-31 PROCEDURE — 665999 HH PPS REVENUE DEBIT
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
## 2017-04-01 PROCEDURE — 665998 HH PPS REVENUE CREDIT

## 2017-04-01 PROCEDURE — 665999 HH PPS REVENUE DEBIT

## 2017-04-02 PROCEDURE — 665999 HH PPS REVENUE DEBIT

## 2017-04-02 PROCEDURE — 665998 HH PPS REVENUE CREDIT

## 2017-04-03 PROCEDURE — 665999 HH PPS REVENUE DEBIT

## 2017-04-03 PROCEDURE — 665998 HH PPS REVENUE CREDIT

## 2017-04-04 ENCOUNTER — NON-PROVIDER VISIT (OUTPATIENT)
Dept: WOUND CARE | Facility: MEDICAL CENTER | Age: 77
End: 2017-04-04
Attending: FAMILY MEDICINE
Payer: MEDICARE

## 2017-04-04 PROCEDURE — 665998 HH PPS REVENUE CREDIT

## 2017-04-04 PROCEDURE — 665999 HH PPS REVENUE DEBIT

## 2017-04-04 PROCEDURE — A6402 STERILE GAUZE <= 16 SQ IN: HCPCS

## 2017-04-04 PROCEDURE — A6213 FOAM DRG >16<=48 SQ IN W/BDR: HCPCS

## 2017-04-04 PROCEDURE — 97597 DBRDMT OPN WND 1ST 20 CM/<: CPT

## 2017-04-04 PROCEDURE — 303972 HCHG HYPAFIX RET DRST 18SQ PC"

## 2017-04-04 PROCEDURE — 16020 DRESS/DEBRID P-THICK BURN S: CPT
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
## 2017-04-05 PROCEDURE — 665999 HH PPS REVENUE DEBIT

## 2017-04-05 PROCEDURE — 665998 HH PPS REVENUE CREDIT

## 2017-04-06 PROCEDURE — 665999 HH PPS REVENUE DEBIT

## 2017-04-06 PROCEDURE — 665998 HH PPS REVENUE CREDIT

## 2017-04-07 ENCOUNTER — NON-PROVIDER VISIT (OUTPATIENT)
Dept: WOUND CARE | Facility: MEDICAL CENTER | Age: 77
End: 2017-04-07
Attending: FAMILY MEDICINE
Payer: MEDICARE

## 2017-04-07 PROCEDURE — 665998 HH PPS REVENUE CREDIT

## 2017-04-07 PROCEDURE — 665999 HH PPS REVENUE DEBIT

## 2017-04-07 PROCEDURE — A6213 FOAM DRG >16<=48 SQ IN W/BDR: HCPCS

## 2017-04-07 PROCEDURE — 16020 DRESS/DEBRID P-THICK BURN S: CPT

## 2017-04-07 PROCEDURE — A6402 STERILE GAUZE <= 16 SQ IN: HCPCS

## 2017-04-07 PROCEDURE — 97597 DBRDMT OPN WND 1ST 20 CM/<: CPT
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
## 2017-04-08 PROCEDURE — 665998 HH PPS REVENUE CREDIT

## 2017-04-08 PROCEDURE — 665999 HH PPS REVENUE DEBIT

## 2017-04-09 PROCEDURE — 665998 HH PPS REVENUE CREDIT

## 2017-04-09 PROCEDURE — 665999 HH PPS REVENUE DEBIT

## 2017-04-10 PROCEDURE — 665999 HH PPS REVENUE DEBIT

## 2017-04-10 PROCEDURE — 665998 HH PPS REVENUE CREDIT

## 2017-04-11 ENCOUNTER — NON-PROVIDER VISIT (OUTPATIENT)
Dept: WOUND CARE | Facility: MEDICAL CENTER | Age: 77
End: 2017-04-11
Attending: FAMILY MEDICINE
Payer: MEDICARE

## 2017-04-11 PROCEDURE — A6235 HYDROCOLLD DRG >16<=48 W/O B: HCPCS

## 2017-04-11 PROCEDURE — A6237 HYDROCOLLD DRG <=16 IN W/BDR: HCPCS

## 2017-04-11 PROCEDURE — A6213 FOAM DRG >16<=48 SQ IN W/BDR: HCPCS

## 2017-04-11 PROCEDURE — A6402 STERILE GAUZE <= 16 SQ IN: HCPCS

## 2017-04-11 PROCEDURE — 16020 DRESS/DEBRID P-THICK BURN S: CPT

## 2017-04-11 PROCEDURE — 665999 HH PPS REVENUE DEBIT

## 2017-04-11 PROCEDURE — 97597 DBRDMT OPN WND 1ST 20 CM/<: CPT

## 2017-04-11 PROCEDURE — 665998 HH PPS REVENUE CREDIT
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
## 2017-04-12 PROCEDURE — 665999 HH PPS REVENUE DEBIT

## 2017-04-12 PROCEDURE — 665998 HH PPS REVENUE CREDIT

## 2017-04-13 PROCEDURE — 665998 HH PPS REVENUE CREDIT

## 2017-04-13 PROCEDURE — 665999 HH PPS REVENUE DEBIT

## 2017-04-14 ENCOUNTER — HOSPITAL ENCOUNTER (OUTPATIENT)
Facility: MEDICAL CENTER | Age: 77
End: 2017-04-14
Attending: FAMILY MEDICINE
Payer: MEDICARE

## 2017-04-14 ENCOUNTER — NON-PROVIDER VISIT (OUTPATIENT)
Dept: WOUND CARE | Facility: MEDICAL CENTER | Age: 77
End: 2017-04-14
Attending: FAMILY MEDICINE
Payer: MEDICARE

## 2017-04-14 DIAGNOSIS — S31.819D WOUND, OPEN, BUTTOCK, RIGHT, SUBSEQUENT ENCOUNTER: ICD-10-CM

## 2017-04-14 LAB
GRAM STN SPEC: NORMAL
SIGNIFICANT IND 70042: NORMAL
SITE SITE: NORMAL
SOURCE SOURCE: NORMAL

## 2017-04-14 PROCEDURE — 87070 CULTURE OTHR SPECIMN AEROBIC: CPT

## 2017-04-14 PROCEDURE — A6213 FOAM DRG >16<=48 SQ IN W/BDR: HCPCS

## 2017-04-14 PROCEDURE — 16020 DRESS/DEBRID P-THICK BURN S: CPT

## 2017-04-14 PROCEDURE — 97597 DBRDMT OPN WND 1ST 20 CM/<: CPT

## 2017-04-14 PROCEDURE — A6402 STERILE GAUZE <= 16 SQ IN: HCPCS

## 2017-04-14 PROCEDURE — A6197 ALGINATE DRSG >16 <=48 SQ IN: HCPCS

## 2017-04-14 PROCEDURE — 665998 HH PPS REVENUE CREDIT

## 2017-04-14 PROCEDURE — 665999 HH PPS REVENUE DEBIT

## 2017-04-14 PROCEDURE — 302717 HCHG ABSORBANT-ANTI MICROBIAL 7 DAY

## 2017-04-14 PROCEDURE — 87205 SMEAR GRAM STAIN: CPT
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
## 2017-04-16 LAB
BACTERIA WND AEROBE CULT: NORMAL
GRAM STN SPEC: NORMAL
SIGNIFICANT IND 70042: NORMAL
SITE SITE: NORMAL
SOURCE SOURCE: NORMAL

## 2017-04-18 ENCOUNTER — NON-PROVIDER VISIT (OUTPATIENT)
Dept: WOUND CARE | Facility: MEDICAL CENTER | Age: 77
End: 2017-04-18
Attending: FAMILY MEDICINE
Payer: MEDICARE

## 2017-04-18 PROCEDURE — 16020 DRESS/DEBRID P-THICK BURN S: CPT

## 2017-04-18 PROCEDURE — 97597 DBRDMT OPN WND 1ST 20 CM/<: CPT

## 2017-04-18 PROCEDURE — A6402 STERILE GAUZE <= 16 SQ IN: HCPCS

## 2017-04-18 PROCEDURE — A6213 FOAM DRG >16<=48 SQ IN W/BDR: HCPCS
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
## 2017-04-21 ENCOUNTER — NON-PROVIDER VISIT (OUTPATIENT)
Dept: WOUND CARE | Facility: MEDICAL CENTER | Age: 77
End: 2017-04-21
Attending: FAMILY MEDICINE
Payer: MEDICARE

## 2017-04-21 PROCEDURE — 16020 DRESS/DEBRID P-THICK BURN S: CPT

## 2017-04-21 PROCEDURE — 97602 WOUND(S) CARE NON-SELECTIVE: CPT

## 2017-04-21 PROCEDURE — A6213 FOAM DRG >16<=48 SQ IN W/BDR: HCPCS
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
## 2017-04-24 ENCOUNTER — NON-PROVIDER VISIT (OUTPATIENT)
Dept: WOUND CARE | Facility: MEDICAL CENTER | Age: 77
End: 2017-04-24
Attending: FAMILY MEDICINE
Payer: MEDICARE

## 2017-04-24 PROCEDURE — 97597 DBRDMT OPN WND 1ST 20 CM/<: CPT

## 2017-04-24 PROCEDURE — G8991 OTHER PT/OT GOAL STATUS: HCPCS | Mod: CH

## 2017-04-24 PROCEDURE — G8990 OTHER PT/OT CURRENT STATUS: HCPCS | Mod: CH

## 2017-04-24 PROCEDURE — A6402 STERILE GAUZE <= 16 SQ IN: HCPCS

## 2017-04-24 PROCEDURE — A6213 FOAM DRG >16<=48 SQ IN W/BDR: HCPCS

## 2017-04-24 PROCEDURE — 16020 DRESS/DEBRID P-THICK BURN S: CPT
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
## 2017-04-25 ENCOUNTER — TELEPHONE (OUTPATIENT)
Dept: CARDIOLOGY | Facility: MEDICAL CENTER | Age: 77
End: 2017-04-25

## 2017-04-25 DIAGNOSIS — R00.2 PALPITATIONS: ICD-10-CM

## 2017-04-25 DIAGNOSIS — R55 NEAR SYNCOPE: ICD-10-CM

## 2017-04-25 PROCEDURE — 0298T PR EXT ECG > 48HR TO 21 DAY REVIEW AND INTERPRETATN: CPT | Performed by: INTERNAL MEDICINE

## 2017-04-25 PROCEDURE — 0296T PR EXT ECG > 48HR TO 21 DAY RCRD W/CONECT INTL RCRD: CPT | Performed by: INTERNAL MEDICINE

## 2017-04-25 NOTE — TELEPHONE ENCOUNTER
Reviewed miguelina.     Average HR 76 bpm.   7 runs of SVT. Longest was ~21 seconds. No associated symptoms.   Given pt's symptoms during his last visit, would like to start metoprolol 25mg bid.     Dante William - can you please let patient know? If agreeable, can you call in the metoprolol?    Thank you  AA

## 2017-04-28 ENCOUNTER — NON-PROVIDER VISIT (OUTPATIENT)
Dept: WOUND CARE | Facility: MEDICAL CENTER | Age: 77
End: 2017-04-28
Attending: FAMILY MEDICINE
Payer: MEDICARE

## 2017-04-28 PROCEDURE — A6402 STERILE GAUZE <= 16 SQ IN: HCPCS

## 2017-04-28 PROCEDURE — A6257 TRANSPARENT FILM <= 16 SQ IN: HCPCS

## 2017-04-28 PROCEDURE — A6213 FOAM DRG >16<=48 SQ IN W/BDR: HCPCS

## 2017-04-28 PROCEDURE — 16020 DRESS/DEBRID P-THICK BURN S: CPT

## 2017-04-28 PROCEDURE — A6212 FOAM DRG <=16 SQ IN W/BORDER: HCPCS
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
## 2017-05-02 ENCOUNTER — NON-PROVIDER VISIT (OUTPATIENT)
Dept: WOUND CARE | Facility: MEDICAL CENTER | Age: 77
End: 2017-05-02
Attending: FAMILY MEDICINE
Payer: MEDICARE

## 2017-05-02 PROCEDURE — 16020 DRESS/DEBRID P-THICK BURN S: CPT

## 2017-05-02 PROCEDURE — A6212 FOAM DRG <=16 SQ IN W/BORDER: HCPCS

## 2017-05-02 PROCEDURE — A6402 STERILE GAUZE <= 16 SQ IN: HCPCS

## 2017-05-02 PROCEDURE — A6209 FOAM DRSG <=16 SQ IN W/O BDR: HCPCS

## 2017-05-02 PROCEDURE — 97602 WOUND(S) CARE NON-SELECTIVE: CPT
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
## 2017-05-03 ENCOUNTER — TELEPHONE (OUTPATIENT)
Dept: CARDIOLOGY | Facility: MEDICAL CENTER | Age: 77
End: 2017-05-03

## 2017-05-03 NOTE — TELEPHONE ENCOUNTER
Request from Dr. Zollinger/ [facility] to temporarily hold Eliquis for lumbar trigger point injections & selective nerve root block, not yet scheduled. Last seen by Dr. Morataya 3/21. Next FV 6/6.  To Dr. Morataya to advise if OK & length of time she recommends to hold.     (Fax [phone]  Attn: Jim Philippe PA-C)

## 2017-05-05 ENCOUNTER — NON-PROVIDER VISIT (OUTPATIENT)
Dept: WOUND CARE | Facility: MEDICAL CENTER | Age: 77
End: 2017-05-05
Attending: FAMILY MEDICINE
Payer: MEDICARE

## 2017-05-05 PROCEDURE — A6212 FOAM DRG <=16 SQ IN W/BORDER: HCPCS

## 2017-05-05 PROCEDURE — 16020 DRESS/DEBRID P-THICK BURN S: CPT

## 2017-05-05 PROCEDURE — A6402 STERILE GAUZE <= 16 SQ IN: HCPCS

## 2017-05-05 PROCEDURE — 97597 DBRDMT OPN WND 1ST 20 CM/<: CPT
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
## 2017-05-05 NOTE — TELEPHONE ENCOUNTER
Dr. Morataya reviewed and advises patient is on Eliquis for treatment of Pulmonary Emboli.   Generally antiocoagulation needs to be continued for the entire 6 months after diagnosis.   However Dr. Morataya will defer the decision to hold anticoagulation to patients Primary Care Physician   Dr. Basilio Raymundo.   Faxed note to  Dr. Zollinger below. jlf

## 2017-05-09 ENCOUNTER — NON-PROVIDER VISIT (OUTPATIENT)
Dept: WOUND CARE | Facility: MEDICAL CENTER | Age: 77
End: 2017-05-09
Attending: FAMILY MEDICINE
Payer: MEDICARE

## 2017-05-09 PROCEDURE — A6212 FOAM DRG <=16 SQ IN W/BORDER: HCPCS

## 2017-05-09 PROCEDURE — 97597 DBRDMT OPN WND 1ST 20 CM/<: CPT

## 2017-05-09 PROCEDURE — A6402 STERILE GAUZE <= 16 SQ IN: HCPCS

## 2017-05-09 PROCEDURE — 16020 DRESS/DEBRID P-THICK BURN S: CPT
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
## 2017-05-12 ENCOUNTER — NON-PROVIDER VISIT (OUTPATIENT)
Dept: WOUND CARE | Facility: MEDICAL CENTER | Age: 77
End: 2017-05-12
Attending: FAMILY MEDICINE
Payer: MEDICARE

## 2017-05-12 PROCEDURE — 97597 DBRDMT OPN WND 1ST 20 CM/<: CPT

## 2017-05-12 PROCEDURE — A6212 FOAM DRG <=16 SQ IN W/BORDER: HCPCS

## 2017-05-12 PROCEDURE — A6402 STERILE GAUZE <= 16 SQ IN: HCPCS

## 2017-05-12 PROCEDURE — 303972 HCHG HYPAFIX RET DRST 18SQ PC"

## 2017-05-12 PROCEDURE — 16020 DRESS/DEBRID P-THICK BURN S: CPT
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
## 2017-05-16 ENCOUNTER — NON-PROVIDER VISIT (OUTPATIENT)
Dept: WOUND CARE | Facility: MEDICAL CENTER | Age: 77
End: 2017-05-16
Attending: FAMILY MEDICINE
Payer: MEDICARE

## 2017-05-16 PROCEDURE — A6402 STERILE GAUZE <= 16 SQ IN: HCPCS

## 2017-05-16 PROCEDURE — 97602 WOUND(S) CARE NON-SELECTIVE: CPT

## 2017-05-16 PROCEDURE — 16020 DRESS/DEBRID P-THICK BURN S: CPT

## 2017-05-16 PROCEDURE — A6212 FOAM DRG <=16 SQ IN W/BORDER: HCPCS
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
## 2017-05-18 ENCOUNTER — NON-PROVIDER VISIT (OUTPATIENT)
Dept: WOUND CARE | Facility: MEDICAL CENTER | Age: 77
End: 2017-05-18
Attending: FAMILY MEDICINE
Payer: MEDICARE

## 2017-05-18 PROCEDURE — 16020 DRESS/DEBRID P-THICK BURN S: CPT

## 2017-05-18 PROCEDURE — 97597 DBRDMT OPN WND 1ST 20 CM/<: CPT

## 2017-05-18 PROCEDURE — A6402 STERILE GAUZE <= 16 SQ IN: HCPCS

## 2017-05-18 PROCEDURE — 303972 HCHG HYPAFIX RET DRST 18SQ PC"

## 2017-05-18 PROCEDURE — A6407 PACKING STRIPS, NON-IMPREG: HCPCS

## 2017-05-18 RX ORDER — ONDANSETRON 4 MG/1
4 TABLET, ORALLY DISINTEGRATING ORAL EVERY 8 HOURS PRN
COMMUNITY
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
## 2017-05-23 ENCOUNTER — NON-PROVIDER VISIT (OUTPATIENT)
Dept: WOUND CARE | Facility: MEDICAL CENTER | Age: 77
End: 2017-05-23
Attending: FAMILY MEDICINE
Payer: MEDICARE

## 2017-05-23 PROCEDURE — 97602 WOUND(S) CARE NON-SELECTIVE: CPT

## 2017-05-23 PROCEDURE — 16020 DRESS/DEBRID P-THICK BURN S: CPT

## 2017-05-23 PROCEDURE — A6402 STERILE GAUZE <= 16 SQ IN: HCPCS

## 2017-05-23 PROCEDURE — A6257 TRANSPARENT FILM <= 16 SQ IN: HCPCS
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
## 2017-05-26 ENCOUNTER — NON-PROVIDER VISIT (OUTPATIENT)
Dept: WOUND CARE | Facility: MEDICAL CENTER | Age: 77
End: 2017-05-26
Attending: FAMILY MEDICINE
Payer: MEDICARE

## 2017-05-26 PROCEDURE — A6402 STERILE GAUZE <= 16 SQ IN: HCPCS

## 2017-05-26 PROCEDURE — 99212 OFFICE O/P EST SF 10 MIN: CPT
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
## 2017-05-30 ENCOUNTER — APPOINTMENT (OUTPATIENT)
Dept: WOUND CARE | Facility: MEDICAL CENTER | Age: 77
End: 2017-05-30
Attending: FAMILY MEDICINE
Payer: MEDICARE

## 2017-06-02 ENCOUNTER — APPOINTMENT (OUTPATIENT)
Dept: WOUND CARE | Facility: MEDICAL CENTER | Age: 77
End: 2017-06-02
Attending: FAMILY MEDICINE
Payer: MEDICARE

## 2017-06-06 ENCOUNTER — OFFICE VISIT (OUTPATIENT)
Dept: CARDIOLOGY | Facility: MEDICAL CENTER | Age: 77
End: 2017-06-06
Payer: MEDICARE

## 2017-06-06 VITALS
HEART RATE: 74 BPM | WEIGHT: 110 LBS | BODY MASS INDEX: 18.78 KG/M2 | DIASTOLIC BLOOD PRESSURE: 70 MMHG | HEIGHT: 64 IN | SYSTOLIC BLOOD PRESSURE: 114 MMHG | OXYGEN SATURATION: 98 %

## 2017-06-06 DIAGNOSIS — R60.9 DEPENDENT EDEMA: ICD-10-CM

## 2017-06-06 DIAGNOSIS — R55 NEAR SYNCOPE: ICD-10-CM

## 2017-06-06 DIAGNOSIS — E78.5 HYPERLIPIDEMIA, UNSPECIFIED HYPERLIPIDEMIA TYPE: ICD-10-CM

## 2017-06-06 PROCEDURE — 99213 OFFICE O/P EST LOW 20 MIN: CPT | Performed by: INTERNAL MEDICINE

## 2017-06-06 PROCEDURE — 1036F TOBACCO NON-USER: CPT | Performed by: INTERNAL MEDICINE

## 2017-06-06 PROCEDURE — G8420 CALC BMI NORM PARAMETERS: HCPCS | Performed by: INTERNAL MEDICINE

## 2017-06-06 PROCEDURE — G8432 DEP SCR NOT DOC, RNG: HCPCS | Performed by: INTERNAL MEDICINE

## 2017-06-06 PROCEDURE — 1101F PT FALLS ASSESS-DOCD LE1/YR: CPT | Mod: 8P | Performed by: INTERNAL MEDICINE

## 2017-06-06 PROCEDURE — 4040F PNEUMOC VAC/ADMIN/RCVD: CPT | Mod: 8P | Performed by: INTERNAL MEDICINE

## 2017-06-06 RX ORDER — TAMSULOSIN HYDROCHLORIDE 0.4 MG/1
CAPSULE ORAL
Refills: 5 | COMMUNITY
Start: 2017-06-01

## 2017-06-06 RX ORDER — DOXYCYCLINE 100 MG/1
CAPSULE ORAL
Refills: 0 | COMMUNITY
Start: 2017-06-01 | End: 2017-08-09

## 2017-06-06 NOTE — Clinical Note
[facility]   [address] [place] [address] [address]  [place] [address]  Phone: [phone]  Fax: [phone]              Dewey Burton  1940    Encounter Date: 6/6/2017    Nikki Morataya M.D.          PROGRESS NOTE:  Subjective:   Dewey Burton is a 76 y.o. male with past medical history significant for hyperlipidemia who was recently admitted to [facility] and diagnosed with acute PE was referred to cardiology clinic due to ongoing presyncope. He was seen in clinic in March 2017. His presyncope was thought to be secondary to his PE. After his last visit he stopped his muscle relaxant and since then his presyncopal symptoms completely resolved.    His main concern today is lower extremity edema. His Lasix was discontinued at his last visit as his edema was thought to be dependent in nature. In the past couple of months he has noticed that his swelling has returned. He does note that his edema is improved and he first wakes up in the morning. He denies any PND. He has stable orthopnea for the past 2 years or longer. He does not add salt to his meals.    No chest discomfort or dyspnea. Patient underwent an event monitor in April which showed short runs of SVT and he was started on metoprolol. Since being on metoprolol he denies any recurrent palpitations.       Past medical history  Hyperlipidemia  Acute PE (early 2017)  Mild carotid stenosis    History reviewed. No pertinent past surgical history.     History reviewed. No pertinent family history.  History   Smoking status   • Former Smoker -- 1.50 packs/day   • Types: Cigarettes   • Quit date: 12/01/2012   Smokeless tobacco   • Never Used     Patient smoked 1-1/2 packs per day for 58 years, quit 2012. Drinks about 3 cans of beer per day. No history of recreational drug use.    Allergies   Allergen Reactions   • Lorazepam      STATES PARANOIA, HALLUCINATIONS     Outpatient Encounter Prescriptions as of 6/6/2017   Medication Sig  "Dispense Refill   • tamsulosin (FLOMAX) 0.4 MG capsule TK ONE C PO QD  5   • aspirin EC (ECOTRIN) 81 MG Tablet Delayed Response Take 81 mg by mouth every day.     • ondansetron (ZOFRAN ODT) 4 MG TABLET DISPERSIBLE Take 4 mg by mouth every 8 hours as needed for Nausea/Vomiting. Indications: n/v     • metoprolol (LOPRESSOR) 25 MG Tab Take 1 Tab by mouth 2 times a day. 60 Tab 6   • atorvastatin (LIPITOR) 20 MG Tab Take 20 mg by mouth every evening.     • tiotropium (SPIRIVA) 18 MCG Cap Inhale 18 mcg by mouth every day.     • apixaban (ELIQUIS) 5mg Tab Take 5 mg by mouth 2 Times a Day.     • meloxicam (MOBIC) 15 MG tablet Take 15 mg by mouth 1 time daily as needed for Moderate Pain.     • cetirizine (ZYRTEC) 10 MG Tab Take 10 mg by mouth every day.     • Ibuprofen-Diphenhydramine Cit (ADVIL PM) 200-38 MG Tab Take 3 Tabs by mouth every bedtime.     • omeprazole (PRILOSEC) 20 MG delayed-release capsule Take 20 mg by mouth 2 times a day.     • Tiotropium Bromide Monohydrate (SPIRIVA HANDIHALER INH) Inhale  by mouth.     • doxycycline (MONODOX) 100 MG capsule TK ONE C PO BID  0   • Oxycodone HCl 20 MG Tab Take 1 Tab by mouth 4 times a day as needed (PAIN).       No facility-administered encounter medications on file as of 6/6/2017.     Review of Systems   Constitutional: Negative for malaise/fatigue.   Respiratory: Negative for shortness of breath  Cardiovascular: Negative for palpitations. Negative for chest pain and PND.  positive for lower extremity edema and orthopnea.  Gastrointestinal: Negative for abdominal pain.   Musculoskeletal: Negative for falls.   Skin: Negative.    Neurological: Negative for dizziness. Negative for loss of consciousness and headaches.   Endo/Heme/Allergies: Does not bruise/bleed easily.   Psychiatric/Behavioral: Negative for depression.   All other systems reviewed and are negative.       Objective:   /70 mmHg  Pulse 74  Ht 1.626 m (5' 4\")  Wt 49.896 kg (110 lb)  BMI 18.87 kg/m2  " SpO2 98%    Physical Exam   Constitutional: He is oriented to person, place, and time. No distress.   HENT:   Head: Normocephalic and atraumatic.   Eyes: Conjunctivae are normal.   Neck: Normal range of motion. Neck supple. No JVD present.   Cardiovascular: Normal rate, regular rhythm and normal heart sounds.  Exam reveals no gallop and no friction rub.    No murmur heard.  Pulmonary/Chest: Effort normal and breath sounds normal. No respiratory distress. He has no wheezes. He has no rales.   Abdominal: Soft. There is no tenderness.   Musculoskeletal: He exhibits 1+ bilateral edema.   Neurological: He is alert and oriented to person, place, and time.   Skin: Skin is warm and dry. He is not diaphoretic.   Psychiatric: He has a normal mood and affect.   Nursing note and vitals reviewed.    Labs from [facility] reviewed. Initially CK was significantly elevated however close to normal at the time of discharge. Creatinine normal at the time of discharge.    Cardiac duplex report from [facility] reviewed. It showed mild atherosclerotic disease bilaterally.    Echocardiogram from [facility] during recent admission also reviewed. It showed an ejection fraction of 60-65% with mild diastolic dysfunction. No major valvular pathology noted.    ECG from March 2017 showed normal sinus rhythm at 65 bpm, normal axis, normal intervals, no Q waves, no ST-T wave changes. ECG unchanged from baseline.    [facility]iop[facility] from April 2017 was reviewed - normal sinus rhythm, average HR 76 bpm.  7 runs of SVT. Longest was ~21 seconds. No associated symptoms.      Assessment:     1. Near syncope     2. Hyperlipidemia, unspecified hyperlipidemia type     3. Dependent edema         Medical Decision Making:  Today's Assessment / Status / Plan:     Near syncope - possibly secondary to PE versus muscle relaxant. Now resolved.    Short runs of SVT-noted on event monitor. Patient started on metoprolol and his palpitations have now resolved.    Lower  extremity edema - likely dependent. Echocardiogram was relatively unremarkable with mild diastolic dysfunction. Patient was again advised to elevate his legs as much as possible. And use compression stockings as needed. I don't think he needs Lasix at this time however patient was advised to do the lifestyle modifications mentioned above and if he keeps having swelling he should let us know.    Return to clinic in 1 year or prn.    Thank you for allowing me to participate in the care of this patient. Please do not hesitate to contact me with any questions.    Nikki Morataya MD  Cardiologist  [facility]      Basilio Raymundo M.D.  [address]  [address]  [place] [address]  VIA Facsimile: [phone]
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
## 2017-08-09 ENCOUNTER — OFFICE VISIT (OUTPATIENT)
Dept: CARDIOLOGY | Facility: MEDICAL CENTER | Age: 77
End: 2017-08-09
Payer: MEDICARE

## 2017-08-09 VITALS
HEIGHT: 64 IN | HEART RATE: 68 BPM | OXYGEN SATURATION: 92 % | DIASTOLIC BLOOD PRESSURE: 62 MMHG | SYSTOLIC BLOOD PRESSURE: 120 MMHG | BODY MASS INDEX: 18.22 KG/M2 | WEIGHT: 106.7 LBS

## 2017-08-09 DIAGNOSIS — E78.5 HYPERLIPIDEMIA, UNSPECIFIED HYPERLIPIDEMIA TYPE: ICD-10-CM

## 2017-08-09 DIAGNOSIS — R60.0 EDEMA OF LOWER EXTREMITY, UNSPECIFIED LATERALITY: ICD-10-CM

## 2017-08-09 PROCEDURE — 99214 OFFICE O/P EST MOD 30 MIN: CPT | Performed by: INTERNAL MEDICINE

## 2017-08-09 RX ORDER — FUROSEMIDE 20 MG/1
10 TABLET ORAL
Qty: 30 TAB | Refills: 11 | Status: SHIPPED | OUTPATIENT
Start: 2017-08-09 | End: 2017-10-19

## 2017-08-09 RX ORDER — PHENAZOPYRIDINE HYDROCHLORIDE 200 MG/1
TABLET, FILM COATED ORAL
Refills: 0 | COMMUNITY
Start: 2017-06-01 | End: 2017-08-09

## 2017-08-09 RX ORDER — CIPROFLOXACIN 500 MG/1
TABLET, FILM COATED ORAL
Refills: 0 | COMMUNITY
Start: 2017-05-22 | End: 2017-08-09

## 2017-08-09 RX ORDER — SILVER SULFADIAZINE 1 %
CREAM (GRAM) TOPICAL
COMMUNITY
Start: 2017-06-05 | End: 2017-08-09

## 2017-08-09 RX ORDER — ONDANSETRON 4 MG/1
TABLET, FILM COATED ORAL
Refills: 3 | COMMUNITY
Start: 2017-07-14 | End: 2017-08-09
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
## 2017-08-09 NOTE — PROGRESS NOTES
Subjective:   Dewey Burton is a 76 y.o. male with past medical history significant for hyperlipidemia and history of PE in February 2017 who is returning to clinic for follow-up. Since his last visit with me about 2 months ago, he continues to have bilateral lower extremity edema. He has tried to elevate his legs but has not been very compliant. He tried using compression stockings which did not benefit him much. He does not add salt to his meals. He used to be on chronic Lasix which was discontinued earlier this year and his swelling returned after he was off of Lasix. He denies any PND or orthopnea. No chest discomfort, dyspnea, dizziness or palpitations.    During his hospitalization for the PE, he did have a mild troponin elevation thought to be secondary to demand ischemia.    Past medical history  Hyperlipidemia  Hx of PE (2/2017)  Mild carotid stenosis    History reviewed. No pertinent past surgical history.     History reviewed. No pertinent family history.  History   Smoking status   • Former Smoker -- 1.50 packs/day   • Types: Cigarettes   • Quit date: 12/01/2012   Smokeless tobacco   • Never Used     Patient smoked 1-1/2 packs per day for 58 years, quit 2012. Drinks about 3 cans of beer per day. No history of recreational drug use.    Allergies   Allergen Reactions   • Lorazepam      STATES PARANOIA, HALLUCINATIONS     Outpatient Encounter Prescriptions as of 8/9/2017   Medication Sig Dispense Refill   • furosemide (LASIX) 20 MG Tab Take 0.5 Tabs by mouth every Monday, Wednesday, and Friday. 30 Tab 11   • tamsulosin (FLOMAX) 0.4 MG capsule TK ONE C PO QD  5   • aspirin EC (ECOTRIN) 81 MG Tablet Delayed Response Take 81 mg by mouth every day.     • ondansetron (ZOFRAN ODT) 4 MG TABLET DISPERSIBLE Take 4 mg by mouth every 8 hours as needed for Nausea/Vomiting. Indications: n/v     • metoprolol (LOPRESSOR) 25 MG Tab Take 1 Tab by mouth 2 times a day. 60 Tab 6   • atorvastatin (LIPITOR) 20 MG Tab Take 20 mg  "by mouth every evening.     • tiotropium (SPIRIVA) 18 MCG Cap Inhale 18 mcg by mouth every day.     • apixaban (ELIQUIS) 5mg Tab Take 5 mg by mouth 2 Times a Day.     • meloxicam (MOBIC) 15 MG tablet Take 15 mg by mouth 1 time daily as needed for Moderate Pain.     • cetirizine (ZYRTEC) 10 MG Tab Take 10 mg by mouth every day.     • Ibuprofen-Diphenhydramine Cit (ADVIL PM) 200-38 MG Tab Take 3 Tabs by mouth every bedtime.     • omeprazole (PRILOSEC) 20 MG delayed-release capsule Take 20 mg by mouth 2 times a day.     • Tiotropium Bromide Monohydrate (SPIRIVA HANDIHALER INH) Inhale  by mouth.     • [DISCONTINUED] ciprofloxacin (CIPRO) 500 MG Tab TK 1 T PO  BID FOR 5 DAYS  0   • [DISCONTINUED] ondansetron (ZOFRAN) 4 MG Tab tablet TK 1 T PO Q 6 H PRN  3   • [DISCONTINUED] phenazopyridine (PYRIDIUM) 200 MG Tab TK 1 T PO TID  0   • [DISCONTINUED] SSD 1 % Cream      • [DISCONTINUED] doxycycline (MONODOX) 100 MG capsule TK ONE C PO BID  0   • [DISCONTINUED] Oxycodone HCl 20 MG Tab Take 1 Tab by mouth 4 times a day as needed (PAIN).       No facility-administered encounter medications on file as of 8/9/2017.     Review of Systems   Constitutional: Negative for malaise/fatigue.   Respiratory: Negative for shortness of breath  Cardiovascular: Negative for palpitations. Negative for chest pain and PND.  positive for lower extremity edema.  Gastrointestinal: Negative for abdominal pain.   Musculoskeletal: Negative for falls.   Skin: Negative.    Neurological: Negative for dizziness. Negative for loss of consciousness and headaches.   Endo/Heme/Allergies: Does not bruise/bleed easily.   Psychiatric/Behavioral: Negative for depression.   All other systems reviewed and are negative.       Objective:   /62 mmHg  Pulse 68  Ht 1.626 m (5' 4\")  Wt 48.399 kg (106 lb 11.2 oz)  BMI 18.31 kg/m2  SpO2 92%    Physical Exam   Constitutional: He is oriented to person, place, and time. No distress.   HENT:   Head: Normocephalic and " atraumatic.   Eyes: Conjunctivae are normal.   Neck: Normal range of motion. Neck supple. No JVD present.   Cardiovascular: Normal rate, regular rhythm and normal heart sounds.  Exam reveals no gallop and no friction rub.    No murmur heard.  Pulmonary/Chest: Effort normal and breath sounds normal. No respiratory distress. He has no wheezes. He has no rales.   Abdominal: Soft. There is no tenderness.   Musculoskeletal: He exhibits 1+ bilateral pitting edema.   Neurological: He is alert and oriented to person, place, and time.   Skin: Skin is warm and dry. He is not diaphoretic.   Psychiatric: He has a normal mood and affect.   Nursing note and vitals reviewed.    Cardiac duplex report from [place] reviewed. It showed mild atherosclerotic disease bilaterally.    Echocardiogram from [place] during recent admission also reviewed. It showed an ejection fraction of 60-65% with mild diastolic dysfunction. No major valvular pathology noted.    ECG from March 2017 showed normal sinus rhythm at 65 bpm, normal axis, normal intervals, no Q waves, no ST-T wave changes. ECG unchanged from baseline.    Ziopatch from April 2017 was reviewed - normal sinus rhythm, average HR 76 bpm.  7 runs of SVT. Longest was ~21 seconds. No associated symptoms.      Assessment:     1. Hyperlipidemia, unspecified hyperlipidemia type     2. Edema of lower extremity, unspecified laterality  furosemide (LASIX) 20 MG Tab    BASIC METABOLIC PANEL       Medical Decision Making:  Today's Assessment / Status / Plan:     Short runs of SVT-noted on event monitor. Patient continues to be on metoprolol and denies any recurrent palpitations.     Lower extremity edema - likely dependent, however patient is unable to elevate his legs frequently and does not like wearing compression stockings. Echocardiogram was relatively unremarkable with mild diastolic dysfunction. Had liver injury in the setting of rhabdomyolysis during the PE. No history of liver  dysfunction in the past. He used to previously be on Lasix 20 mg daily for lower extremity edema. His recurrent lower extremity edema occurred after his Lasix was discontinued. I will restart him on Lasix 10 mg Xygxem-Ajqlbxdiw-Dwzwrr. Chem-7 in 2-3 weeks. I advised the patient to call us should his lower extremity edema failed to resolve. I will see the patient back in follow-up in about 3 months. If he continues to have lower extremity edema despite use of Lasix, we'll need to consider other workup.    He is still on anticoagulation and is extremely bothered by significant bruising. I advised him to follow-up with his primary care physician regarding the duration for this therapy.    Return to clinic in 3 months or prn.    Thank you for allowing me to participate in the care of this patient. Please do not hesitate to contact me with any questions.    Nikki Morataya MD  Cardiologist  [facility]

## 2017-08-22 LAB — BUN SERPL-MCNC: 13 MG/DL (ref 7–18)

## 2017-08-23 LAB
HCT VFR BLD CALC: 33.4 % (ref 39.2–51.8)
HGB BLD-MCNC: 10.9 G/DL (ref 13.7–18)
WBC # BLD AUTO: 7.4 X10^3/UL (ref 3.4–10)

## 2017-08-25 ENCOUNTER — HOSPITAL ENCOUNTER (INPATIENT)
Dept: HOSPITAL 8 - ORIP | Age: 77
LOS: 1 days | Discharge: HOME | DRG: 517 | End: 2017-08-26
Attending: ORTHOPAEDIC SURGERY | Admitting: ORTHOPAEDIC SURGERY
Payer: MEDICARE

## 2017-08-25 VITALS — SYSTOLIC BLOOD PRESSURE: 104 MMHG | DIASTOLIC BLOOD PRESSURE: 47 MMHG

## 2017-08-25 VITALS — WEIGHT: 115.08 LBS | BODY MASS INDEX: 20.39 KG/M2 | HEIGHT: 63 IN

## 2017-08-25 VITALS — DIASTOLIC BLOOD PRESSURE: 68 MMHG | SYSTOLIC BLOOD PRESSURE: 139 MMHG

## 2017-08-25 DIAGNOSIS — E78.00: ICD-10-CM

## 2017-08-25 DIAGNOSIS — G89.29: ICD-10-CM

## 2017-08-25 DIAGNOSIS — T84.84XA: Primary | ICD-10-CM
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
## 2017-08-25 PROCEDURE — 80048 BASIC METABOLIC PNL TOTAL CA: CPT

## 2017-08-25 PROCEDURE — 71020: CPT

## 2017-08-25 PROCEDURE — C1760 CLOSURE DEV, VASC: HCPCS

## 2017-08-25 PROCEDURE — 93005 ELECTROCARDIOGRAM TRACING: CPT

## 2017-08-25 PROCEDURE — C1762 CONN TISS, HUMAN(INC FASCIA): HCPCS

## 2017-08-25 PROCEDURE — 85730 THROMBOPLASTIN TIME PARTIAL: CPT

## 2017-08-25 PROCEDURE — 72100 X-RAY EXAM L-S SPINE 2/3 VWS: CPT

## 2017-08-25 PROCEDURE — 81003 URINALYSIS AUTO W/O SCOPE: CPT

## 2017-08-25 PROCEDURE — 85651 RBC SED RATE NONAUTOMATED: CPT

## 2017-08-25 PROCEDURE — 36415 COLL VENOUS BLD VENIPUNCTURE: CPT

## 2017-08-25 PROCEDURE — C9362 IMPLNT,BON VOID FILLER-STRIP: HCPCS

## 2017-08-25 PROCEDURE — 85025 COMPLETE CBC W/AUTO DIFF WBC: CPT

## 2017-08-25 PROCEDURE — 0SP004Z REMOVAL OF INTERNAL FIXATION DEVICE FROM LUMBAR VERTEBRAL JOINT, OPEN APPROACH: ICD-10-PCS | Performed by: ORTHOPAEDIC SURGERY

## 2017-08-25 PROCEDURE — 4A11X4G MONITORING OF PERIPHERAL NERVOUS ELECTRICAL ACTIVITY, INTRAOPERATIVE, EXTERNAL APPROACH: ICD-10-PCS | Performed by: ORTHOPAEDIC SURGERY

## 2017-08-25 PROCEDURE — 85610 PROTHROMBIN TIME: CPT

## 2017-08-25 RX ADMIN — FENTANYL CITRATE PRN MCG: 50 INJECTION INTRAMUSCULAR; INTRAVENOUS at 15:23

## 2017-08-25 RX ADMIN — OXYCODONE HYDROCHLORIDE PRN MG: 5 TABLET ORAL at 18:09

## 2017-08-25 RX ADMIN — DEXTROSE, SODIUM CHLORIDE, AND POTASSIUM CHLORIDE SCH MLS/HR: 5; .9; .15 INJECTION INTRAVENOUS at 18:09

## 2017-08-25 RX ADMIN — HYDROMORPHONE HYDROCHLORIDE PRN MG: 1 INJECTION, SOLUTION INTRAMUSCULAR; INTRAVENOUS; SUBCUTANEOUS at 15:02

## 2017-08-25 RX ADMIN — METOPROLOL TARTRATE SCH MG: 25 TABLET, FILM COATED ORAL at 18:08

## 2017-08-25 RX ADMIN — FENTANYL CITRATE PRN MCG: 50 INJECTION INTRAMUSCULAR; INTRAVENOUS at 14:57

## 2017-08-25 RX ADMIN — HYDROMORPHONE HYDROCHLORIDE PRN MG: 1 INJECTION, SOLUTION INTRAMUSCULAR; INTRAVENOUS; SUBCUTANEOUS at 15:15

## 2017-08-25 RX ADMIN — CEFAZOLIN SODIUM SCH MLS/HR: 1 SOLUTION INTRAVENOUS at 21:22

## 2017-08-26 VITALS — DIASTOLIC BLOOD PRESSURE: 58 MMHG | SYSTOLIC BLOOD PRESSURE: 137 MMHG

## 2017-08-26 VITALS — DIASTOLIC BLOOD PRESSURE: 67 MMHG | SYSTOLIC BLOOD PRESSURE: 151 MMHG

## 2017-08-26 LAB
HCT VFR BLD CALC: 31 % (ref 39.2–51.8)
HGB BLD-MCNC: 9.9 G/DL (ref 13.7–18)
WBC # BLD AUTO: 8.9 X10^3/UL (ref 3.4–10)

## 2017-08-26 RX ADMIN — OMEPRAZOLE SCH MG: 20 CAPSULE, DELAYED RELEASE ORAL at 09:09

## 2017-08-26 RX ADMIN — OMEPRAZOLE SCH MG: 20 CAPSULE, DELAYED RELEASE ORAL at 01:51

## 2017-08-26 RX ADMIN — DEXTROSE, SODIUM CHLORIDE, AND POTASSIUM CHLORIDE SCH MLS/HR: 5; .9; .15 INJECTION INTRAVENOUS at 05:00

## 2017-08-26 RX ADMIN — OXYCODONE HYDROCHLORIDE PRN MG: 5 TABLET ORAL at 02:30

## 2017-08-26 RX ADMIN — CEFAZOLIN SODIUM SCH MLS/HR: 1 SOLUTION INTRAVENOUS at 04:46

## 2017-08-26 RX ADMIN — OXYCODONE HYDROCHLORIDE PRN MG: 5 TABLET ORAL at 09:32

## 2017-08-26 RX ADMIN — METOPROLOL TARTRATE SCH MG: 25 TABLET, FILM COATED ORAL at 06:35

## 2017-10-19 ENCOUNTER — TELEPHONE (OUTPATIENT)
Dept: CARDIOLOGY | Facility: MEDICAL CENTER | Age: 77
End: 2017-10-19

## 2017-10-19 DIAGNOSIS — N17.9 AKI (ACUTE KIDNEY INJURY) (HCC): ICD-10-CM

## 2017-10-19 NOTE — TELEPHONE ENCOUNTER
Spoke with pt, discussed lab results per AA and her recommendations, pt agreed, repeated back instructions and verbalizes understanding    CMP ordered, lab slip mailed to pt, MAR updated

## 2017-10-19 NOTE — TELEPHONE ENCOUNTER
Renal function has worsened with lasix.   Previously <1. Now almost 1.5  Please have patient discontinue Lasix. Repeat Chem-7 in about 7-10 days.    Thanks  AA

## 2017-11-15 LAB
ALBUMIN SERPL-MCNC: 3.9 G/DL (ref 3.5–4.8)
ALBUMIN/GLOB SERPL: 1.3 {RATIO} (ref 1.2–2.2)
ALP SERPL-CCNC: 66 IU/L (ref 39–117)
ALT SERPL-CCNC: 12 IU/L (ref 0–44)
AST SERPL-CCNC: 20 IU/L (ref 0–40)
BILIRUB SERPL-MCNC: 0.4 MG/DL (ref 0–1.2)
BUN SERPL-MCNC: 14 MG/DL (ref 8–27)
BUN/CREAT SERPL: 12 (ref 10–24)
CALCIUM SERPL-MCNC: 9.6 MG/DL (ref 8.6–10.2)
CHLORIDE SERPL-SCNC: 101 MMOL/L (ref 96–106)
CO2 SERPL-SCNC: 26 MMOL/L (ref 18–29)
CREAT SERPL-MCNC: 1.13 MG/DL (ref 0.76–1.27)
GFR SERPLBLD CREATININE-BSD FMLA CKD-EPI: 63 ML/MIN/1.73
GFR SERPLBLD CREATININE-BSD FMLA CKD-EPI: 73 ML/MIN/1.73
GLOBULIN SER CALC-MCNC: 2.9 G/DL (ref 1.5–4.5)
GLUCOSE SERPL-MCNC: 103 MG/DL (ref 65–99)
POTASSIUM SERPL-SCNC: 4.8 MMOL/L (ref 3.5–5.2)
PROT SERPL-MCNC: 6.8 G/DL (ref 6–8.5)
SODIUM SERPL-SCNC: 141 MMOL/L (ref 134–144)

## 2017-11-17 ENCOUNTER — TELEPHONE (OUTPATIENT)
Dept: CARDIOLOGY | Facility: MEDICAL CENTER | Age: 77
End: 2017-11-17

## 2017-11-17 ENCOUNTER — OFFICE VISIT (OUTPATIENT)
Dept: CARDIOLOGY | Facility: MEDICAL CENTER | Age: 77
End: 2017-11-17
Payer: MEDICARE

## 2017-11-17 VITALS
OXYGEN SATURATION: 95 % | SYSTOLIC BLOOD PRESSURE: 130 MMHG | BODY MASS INDEX: 17.58 KG/M2 | HEART RATE: 74 BPM | WEIGHT: 103 LBS | DIASTOLIC BLOOD PRESSURE: 72 MMHG | HEIGHT: 64 IN

## 2017-11-17 DIAGNOSIS — E78.5 HYPERLIPIDEMIA, UNSPECIFIED HYPERLIPIDEMIA TYPE: ICD-10-CM

## 2017-11-17 PROCEDURE — 99214 OFFICE O/P EST MOD 30 MIN: CPT | Performed by: INTERNAL MEDICINE

## 2017-11-17 RX ORDER — TIZANIDINE 4 MG/1
TABLET ORAL
COMMUNITY
Start: 2017-10-29

## 2017-11-17 RX ORDER — INFLUENZA A VIRUS A/IDAHO/07/2018 (H1N1) ANTIGEN (MDCK CELL DERIVED, PROPIOLACTONE INACTIVATED, INFLUENZA A VIRUS A/INDIANA/08/2018 (H3N2) ANTIGEN (MDCK CELL DERIVED, PROPIOLACTONE INACTIVATED), INFLUENZA B VIRUS B/SINGAPORE/INFTT-16-0610/2016 ANTIGEN (MDCK CELL DERIVED, PROPIOLACTONE INACTIVATED), INFLUENZA B VIRUS B/IOWA/06/2017 ANTIGEN (MDCK CELL DERIVED, PROPIOLACTONE INACTIVATED) 15; 15; 15; 15 UG/.5ML; UG/.5ML; UG/.5ML; UG/.5ML
INJECTION, SUSPENSION INTRAMUSCULAR
Refills: 0 | COMMUNITY
Start: 2017-10-03 | End: 2017-11-17

## 2017-11-17 RX ORDER — DIAZEPAM 5 MG/1
TABLET ORAL
Refills: 2 | COMMUNITY
Start: 2017-08-26 | End: 2017-11-17

## 2017-11-17 RX ORDER — CEPHALEXIN 500 MG/1
CAPSULE ORAL
Refills: 1 | COMMUNITY
Start: 2017-08-26 | End: 2017-11-17

## 2017-11-17 RX ORDER — OXYCODONE HYDROCHLORIDE 20 MG/1
TABLET ORAL
COMMUNITY
Start: 2017-11-08

## 2017-11-17 NOTE — LETTER
[facility]   [address]  [place] [address]  Phone: [phone]  Fax: [phone]              Dewey Burton  1940    Encounter Date: 11/17/2017    Nikki Morataya M.D.          PROGRESS NOTE:  Subjective:   Dewey Burton is a 76 y.o. male with past medical history significant for hyperlipidemia and history of PE in February 2017 who is returning to clinic for follow-up. His main concern continues to be bilateral lower extremity edema. His wife thinks his edema has improved from before. He has not been using compression stockings as they are too difficult for him to wear. He has been avoiding salt. Has not been elevating his feet much.    At his last visit, he was started on low-dose Lasix but his renal function worsened while on the Lasix and it had to be discontinued. His renal function normalized after being off of the Lasix.    He has been otherwise asymptomatic. Denies chest discomfort, dyspnea, dizziness or palpitations.    During his hospitalization for the PE, he did have a mild troponin elevation thought to be secondary to demand ischemia.    Past medical history  Hyperlipidemia  Hx of PE (2/2017)  Mild carotid stenosis    History reviewed. No pertinent surgical history.     History reviewed. No pertinent family history.  History   Smoking Status   • Former Smoker   • Packs/day: 1.50   • Types: Cigarettes   • Quit date: 12/1/2012   Smokeless Tobacco   • Never Used     Patient smoked 1-1/2 packs per day for 58 years, quit 2012. Drinks about 3 cans of beer per day. No history of recreational drug use.    Allergies   Allergen Reactions   • Lorazepam      STATES PARANOIA, HALLUCINATIONS     Outpatient Encounter Prescriptions as of 11/17/2017   Medication Sig Dispense Refill   • Oxycodone HCl 20 MG Tab      • tizanidine (ZANAFLEX) 4 MG Tab      • tamsulosin (FLOMAX) 0.4 MG capsule TK ONE C PO QD  5   • aspirin EC (ECOTRIN) 81 MG Tablet Delayed  "Response Take 81 mg by mouth every day.     • ondansetron (ZOFRAN ODT) 4 MG TABLET DISPERSIBLE Take 4 mg by mouth every 8 hours as needed for Nausea/Vomiting. Indications: n/v     • metoprolol (LOPRESSOR) 25 MG Tab Take 1 Tab by mouth 2 times a day. 60 Tab 6   • atorvastatin (LIPITOR) 20 MG Tab Take 20 mg by mouth every evening.     • tiotropium (SPIRIVA) 18 MCG Cap Inhale 18 mcg by mouth every day.     • cetirizine (ZYRTEC) 10 MG Tab Take 10 mg by mouth every day.     • Ibuprofen-Diphenhydramine Cit (ADVIL PM) 200-38 MG Tab Take 3 Tabs by mouth every bedtime.     • omeprazole (PRILOSEC) 20 MG delayed-release capsule Take 20 mg by mouth 2 times a day.     • Tiotropium Bromide Monohydrate (SPIRIVA HANDIHALER INH) Inhale  by mouth.     • [DISCONTINUED] FLUCELVAX QUADRIVALENT 0.5 ML Suspension Prefilled Syringe injection ADM 0.5ML IM UTD  0   • [DISCONTINUED] diazepam (VALIUM) 5 MG Tab TK 1 T PO  Q 6 H PRN  2   • [DISCONTINUED] cephALEXin (KEFLEX) 500 MG Cap TK ONE C PO  TID  1   • [DISCONTINUED] apixaban (ELIQUIS) 5mg Tab Take 5 mg by mouth 2 Times a Day.     • [DISCONTINUED] meloxicam (MOBIC) 15 MG tablet Take 15 mg by mouth 1 time daily as needed for Moderate Pain.       No facility-administered encounter medications on file as of 11/17/2017.      Review of Systems   Constitutional: Negative for malaise/fatigue.   Respiratory: Negative for shortness of breath  Cardiovascular: Negative for palpitations. Negative for chest pain and PND.  positive for lower extremity edema.  Gastrointestinal: Negative for abdominal pain.   Musculoskeletal: Negative for falls.   Skin: Negative.    Neurological: Negative for dizziness. Negative for loss of consciousness and headaches.   Endo/Heme/Allergies: Does not bruise/bleed easily.   Psychiatric/Behavioral: Negative for depression.   All other systems reviewed and are negative.       Objective:   /72   Pulse 74   Ht 1.626 m (5' 4\")   Wt 46.7 kg (103 lb)   SpO2 95%   BMI " 17.68 kg/m²      Physical Exam   Constitutional: He is oriented to person, place, and time. No distress.   HENT:   Head: Normocephalic and atraumatic.   Eyes: Conjunctivae are normal.   Neck: Normal range of motion. Neck supple. No JVD present.   Cardiovascular: Normal rate, regular rhythm and normal heart sounds.  Exam reveals no gallop and no friction rub.    No murmur heard.  Pulmonary/Chest: Effort normal and breath sounds normal. No respiratory distress. He has no wheezes. He has no rales.   Abdominal: Soft. There is no tenderness.   Musculoskeletal: He exhibits 1+ bilateral pitting edema.   Neurological: He is alert and oriented to person, place, and time.   Skin: Skin is warm and dry. He is not diaphoretic.   Psychiatric: He has a normal mood and affect.   Nursing note and vitals reviewed.    Cardiac duplex report from [place] reviewed. It showed mild atherosclerotic disease bilaterally.    Echocardiogram from [place] during recent admission also reviewed. It showed an ejection fraction of 60-65% with mild diastolic dysfunction. No major valvular pathology noted.    ECG from March 2017 showed normal sinus rhythm at 65 bpm, normal axis, normal intervals, no Q waves, no ST-T wave changes. ECG unchanged from baseline.    Ziopatch from April 2017 was reviewed - normal sinus rhythm, average HR 76 bpm.  7 runs of SVT. Longest was ~21 seconds. No associated symptoms.      Labs from November 2017 were reviewed. Normal creatinine.    Assessment:     1. Hyperlipidemia, unspecified hyperlipidemia type         Medical Decision Making:  Today's Assessment / Status / Plan:     Lower extremity edema - likely dependent, as discussed in my previous note. I have once again advised the patient to try wearing compression stockings. He should remember to elevate his legs as much as possible and also exercise them while sitting. Continue to avoid salt intake as much as possible. Given his renal dysfunction while on low-dose  Lasix, I am hesitant to restart him on Lasix at this time. Would encourage lifestyle modification as noted above for now.     Short runs of SVT-noted on event monitor. Patient continues to be on metoprolol and denies any recurrent palpitations.     I have encouraged the patient to start low-level aerobic exercise and increase as tolerated.    Return to clinic in 3 months or prn.    Thank you for allowing me to participate in the care of this patient. Please do not hesitate to contact me with any questions.    Nikki Morataya MD  Cardiologist  [facility]      Basilio Raymundo M.D.  [address]  [address]  [place] [address]  VIA Facsimile: [phone]

## 2017-11-17 NOTE — PROGRESS NOTES
Subjective:   Dewey Burton is a 76 y.o. male with past medical history significant for hyperlipidemia and history of PE in February 2017 who is returning to clinic for follow-up. His main concern continues to be bilateral lower extremity edema. His wife thinks his edema has improved from before. He has not been using compression stockings as they are too difficult for him to wear. He has been avoiding salt. Has not been elevating his feet much.    At his last visit, he was started on low-dose Lasix but his renal function worsened while on the Lasix and it had to be discontinued. His renal function normalized after being off of the Lasix.    He has been otherwise asymptomatic. Denies chest discomfort, dyspnea, dizziness or palpitations.    During his hospitalization for the PE, he did have a mild troponin elevation thought to be secondary to demand ischemia.    Past medical history  Hyperlipidemia  Hx of PE (2/2017)  Mild carotid stenosis    History reviewed. No pertinent surgical history.     History reviewed. No pertinent family history.  History   Smoking Status   • Former Smoker   • Packs/day: 1.50   • Types: Cigarettes   • Quit date: 12/1/2012   Smokeless Tobacco   • Never Used     Patient smoked 1-1/2 packs per day for 58 years, quit 2012. Drinks about 3 cans of beer per day. No history of recreational drug use.    Allergies   Allergen Reactions   • Lorazepam      STATES PARANOIA, HALLUCINATIONS     Outpatient Encounter Prescriptions as of 11/17/2017   Medication Sig Dispense Refill   • Oxycodone HCl 20 MG Tab      • tizanidine (ZANAFLEX) 4 MG Tab      • tamsulosin (FLOMAX) 0.4 MG capsule TK ONE C PO QD  5   • aspirin EC (ECOTRIN) 81 MG Tablet Delayed Response Take 81 mg by mouth every day.     • ondansetron (ZOFRAN ODT) 4 MG TABLET DISPERSIBLE Take 4 mg by mouth every 8 hours as needed for Nausea/Vomiting. Indications: n/v     • metoprolol (LOPRESSOR) 25 MG Tab Take 1 Tab by mouth 2 times a day. 60 Tab 6   •  "atorvastatin (LIPITOR) 20 MG Tab Take 20 mg by mouth every evening.     • tiotropium (SPIRIVA) 18 MCG Cap Inhale 18 mcg by mouth every day.     • cetirizine (ZYRTEC) 10 MG Tab Take 10 mg by mouth every day.     • Ibuprofen-Diphenhydramine Cit (ADVIL PM) 200-38 MG Tab Take 3 Tabs by mouth every bedtime.     • omeprazole (PRILOSEC) 20 MG delayed-release capsule Take 20 mg by mouth 2 times a day.     • Tiotropium Bromide Monohydrate (SPIRIVA HANDIHALER INH) Inhale  by mouth.     • [DISCONTINUED] FLUCELVAX QUADRIVALENT 0.5 ML Suspension Prefilled Syringe injection ADM 0.5ML IM UTD  0   • [DISCONTINUED] diazepam (VALIUM) 5 MG Tab TK 1 T PO  Q 6 H PRN  2   • [DISCONTINUED] cephALEXin (KEFLEX) 500 MG Cap TK ONE C PO  TID  1   • [DISCONTINUED] apixaban (ELIQUIS) 5mg Tab Take 5 mg by mouth 2 Times a Day.     • [DISCONTINUED] meloxicam (MOBIC) 15 MG tablet Take 15 mg by mouth 1 time daily as needed for Moderate Pain.       No facility-administered encounter medications on file as of 11/17/2017.      Review of Systems   Constitutional: Negative for malaise/fatigue.   Respiratory: Negative for shortness of breath  Cardiovascular: Negative for palpitations. Negative for chest pain and PND.  positive for lower extremity edema.  Gastrointestinal: Negative for abdominal pain.   Musculoskeletal: Negative for falls.   Skin: Negative.    Neurological: Negative for dizziness. Negative for loss of consciousness and headaches.   Endo/Heme/Allergies: Does not bruise/bleed easily.   Psychiatric/Behavioral: Negative for depression.   All other systems reviewed and are negative.       Objective:   /72   Pulse 74   Ht 1.626 m (5' 4\")   Wt 46.7 kg (103 lb)   SpO2 95%   BMI 17.68 kg/m²     Physical Exam   Constitutional: He is oriented to person, place, and time. No distress.   HENT:   Head: Normocephalic and atraumatic.   Eyes: Conjunctivae are normal.   Neck: Normal range of motion. Neck supple. No JVD present.   Cardiovascular: " Normal rate, regular rhythm and normal heart sounds.  Exam reveals no gallop and no friction rub.    No murmur heard.  Pulmonary/Chest: Effort normal and breath sounds normal. No respiratory distress. He has no wheezes. He has no rales.   Abdominal: Soft. There is no tenderness.   Musculoskeletal: He exhibits 1+ bilateral pitting edema.   Neurological: He is alert and oriented to person, place, and time.   Skin: Skin is warm and dry. He is not diaphoretic.   Psychiatric: He has a normal mood and affect.   Nursing note and vitals reviewed.    Cardiac duplex report from [place] reviewed. It showed mild atherosclerotic disease bilaterally.    Echocardiogram from [place] during recent admission also reviewed. It showed an ejection fraction of 60-65% with mild diastolic dysfunction. No major valvular pathology noted.    ECG from March 2017 showed normal sinus rhythm at 65 bpm, normal axis, normal intervals, no Q waves, no ST-T wave changes. ECG unchanged from baseline.    Ziopatch from April 2017 was reviewed - normal sinus rhythm, average HR 76 bpm.  7 runs of SVT. Longest was ~21 seconds. No associated symptoms.      Labs from November 2017 were reviewed. Normal creatinine.    Assessment:     1. Hyperlipidemia, unspecified hyperlipidemia type         Medical Decision Making:  Today's Assessment / Status / Plan:     Lower extremity edema - likely dependent, as discussed in my previous note. I have once again advised the patient to try wearing compression stockings. He should remember to elevate his legs as much as possible and also exercise them while sitting. Continue to avoid salt intake as much as possible. Given his renal dysfunction while on low-dose Lasix, I am hesitant to restart him on Lasix at this time. Would encourage lifestyle modification as noted above for now.     Short runs of SVT-noted on event monitor. Patient continues to be on metoprolol and denies any recurrent palpitations.     I have  encouraged the patient to start low-level aerobic exercise and increase as tolerated.    Return to clinic in 3 months or prn.    Thank you for allowing me to participate in the care of this patient. Please do not hesitate to contact me with any questions.    Nikki Morataya MD  Cardiologist  [facility]

## 2017-12-28 DIAGNOSIS — R55 NEAR SYNCOPE: ICD-10-CM

## 2017-12-28 DIAGNOSIS — R00.2 PALPITATIONS: ICD-10-CM

## 2018-07-06 ENCOUNTER — HOSPITAL ENCOUNTER (INPATIENT)
Dept: HOSPITAL 8 - ED | Age: 78
LOS: 6 days | Discharge: HOME | DRG: 242 | End: 2018-07-12
Attending: INTERNAL MEDICINE | Admitting: HOSPITALIST
Payer: MEDICARE

## 2018-07-06 VITALS — DIASTOLIC BLOOD PRESSURE: 70 MMHG | SYSTOLIC BLOOD PRESSURE: 169 MMHG

## 2018-07-06 VITALS — SYSTOLIC BLOOD PRESSURE: 146 MMHG | DIASTOLIC BLOOD PRESSURE: 63 MMHG

## 2018-07-06 VITALS — WEIGHT: 107.59 LBS | HEIGHT: 63 IN | BODY MASS INDEX: 19.06 KG/M2

## 2018-07-06 VITALS — DIASTOLIC BLOOD PRESSURE: 63 MMHG | SYSTOLIC BLOOD PRESSURE: 112 MMHG

## 2018-07-06 VITALS — DIASTOLIC BLOOD PRESSURE: 68 MMHG | SYSTOLIC BLOOD PRESSURE: 132 MMHG

## 2018-07-06 DIAGNOSIS — I47.2: ICD-10-CM

## 2018-07-06 DIAGNOSIS — Z86.711: ICD-10-CM

## 2018-07-06 DIAGNOSIS — Z87.891: ICD-10-CM

## 2018-07-06 DIAGNOSIS — E44.1: ICD-10-CM

## 2018-07-06 DIAGNOSIS — D64.9: ICD-10-CM

## 2018-07-06 DIAGNOSIS — Z99.81: ICD-10-CM

## 2018-07-06 DIAGNOSIS — N17.0: ICD-10-CM

## 2018-07-06 DIAGNOSIS — E16.2: ICD-10-CM

## 2018-07-06 DIAGNOSIS — I95.9: ICD-10-CM

## 2018-07-06 DIAGNOSIS — J44.1: ICD-10-CM

## 2018-07-06 DIAGNOSIS — Z82.49: ICD-10-CM

## 2018-07-06 DIAGNOSIS — I49.5: Primary | ICD-10-CM

## 2018-07-06 DIAGNOSIS — S20.219A: ICD-10-CM

## 2018-07-06 DIAGNOSIS — J96.01: ICD-10-CM

## 2018-07-06 DIAGNOSIS — E87.6: ICD-10-CM

## 2018-07-06 DIAGNOSIS — E78.5: ICD-10-CM

## 2018-07-06 DIAGNOSIS — I27.29: ICD-10-CM

## 2018-07-06 DIAGNOSIS — I27.20: ICD-10-CM

## 2018-07-06 DIAGNOSIS — I08.1: ICD-10-CM

## 2018-07-06 DIAGNOSIS — Z66: ICD-10-CM

## 2018-07-06 DIAGNOSIS — I27.81: ICD-10-CM

## 2018-07-06 DIAGNOSIS — K21.9: ICD-10-CM

## 2018-07-06 DIAGNOSIS — T38.0X5A: ICD-10-CM

## 2018-07-06 LAB
<PLATELET ESTIMATE>: ADEQUATE
<PLT MORPHOLOGY>: (no result)
ALBUMIN SERPL-MCNC: 3.2 G/DL (ref 3.4–5)
ANION GAP SERPL CALC-SCNC: 8 MMOL/L (ref 5–15)
ANISOCYTOSIS BLD QL SMEAR: (no result)
BASOPHILS NFR BLD MANUAL: 1 % (ref 0–1)
BASOS#(MANUAL): 0.07 X10^3/UL (ref 0–0.1)
CALCIUM SERPL-MCNC: 8.3 MG/DL (ref 8.5–10.1)
CHLORIDE SERPL-SCNC: 102 MMOL/L (ref 98–107)
CREAT SERPL-MCNC: 1.31 MG/DL (ref 0.7–1.3)
EOS#(MANUAL): 0.21 X10^3/UL (ref 0–0.4)
EOS% (MANUAL): 3 % (ref 1–7)
ERYTHROCYTE [DISTWIDTH] IN BLOOD BY AUTOMATED COUNT: 22.6 % (ref 9.4–14.8)
HYPOCHROMIA BLD QL SMEAR: (no result)
LYMPH#(MANUAL): 2.06 X10^3/UL (ref 1–3.4)
LYMPHS% (MANUAL): 29 % (ref 22–44)
MCH RBC QN AUTO: 27.4 PG (ref 27.5–34.5)
MCHC RBC AUTO-ENTMCNC: 31.6 G/DL (ref 33.2–36.2)
MCV RBC AUTO: 86.7 FL (ref 81–97)
MD: YES
MONOS#(MANUAL): 0.92 X10^3/UL (ref 0.3–2.7)
MONOS% (MANUAL): 13 % (ref 2–9)
OVALOCYTES BLD QL SMEAR: (no result)
PLATELET # BLD AUTO: 284 X10^3/UL (ref 130–400)
PMV BLD AUTO: 9.6 FL (ref 7.4–10.4)
RBC # BLD AUTO: 3.53 X10^6/UL (ref 4.38–5.82)
SEG#(MANUAL): 3.83 X10^3/UL (ref 1.8–6.8)
SEGS% (MANUAL): 54 % (ref 42–75)
TROPONIN I SERPL-MCNC: < 0.015 NG/ML (ref 0–0.04)
TROPONIN I SERPL-MCNC: < 0.015 NG/ML (ref 0–0.04)

## 2018-07-06 PROCEDURE — A9558 XE133 XENON 10MCI: HCPCS

## 2018-07-06 PROCEDURE — C1785 PMKR, DUAL, RATE-RESP: HCPCS

## 2018-07-06 PROCEDURE — 71045 X-RAY EXAM CHEST 1 VIEW: CPT

## 2018-07-06 PROCEDURE — 93306 TTE W/DOPPLER COMPLETE: CPT

## 2018-07-06 PROCEDURE — 86923 COMPATIBILITY TEST ELECTRIC: CPT

## 2018-07-06 PROCEDURE — 93970 EXTREMITY STUDY: CPT

## 2018-07-06 PROCEDURE — C1892 INTRO/SHEATH,FIXED,PEEL-AWAY: HCPCS

## 2018-07-06 PROCEDURE — 99157 MOD SED OTHER PHYS/QHP EA: CPT

## 2018-07-06 PROCEDURE — 78582 LUNG VENTILAT&PERFUS IMAGING: CPT

## 2018-07-06 PROCEDURE — 84484 ASSAY OF TROPONIN QUANT: CPT

## 2018-07-06 PROCEDURE — P9016 RBC LEUKOCYTES REDUCED: HCPCS

## 2018-07-06 PROCEDURE — 94640 AIRWAY INHALATION TREATMENT: CPT

## 2018-07-06 PROCEDURE — 86900 BLOOD TYPING SEROLOGIC ABO: CPT

## 2018-07-06 PROCEDURE — 99156 MOD SED OTH PHYS/QHP 5/>YRS: CPT

## 2018-07-06 PROCEDURE — 86850 RBC ANTIBODY SCREEN: CPT

## 2018-07-06 PROCEDURE — 84443 ASSAY THYROID STIM HORMONE: CPT

## 2018-07-06 PROCEDURE — 10140 I&D HMTMA SEROMA/FLUID COLLJ: CPT

## 2018-07-06 PROCEDURE — C1779 LEAD, PMKR, TRANSVENOUS VDD: HCPCS

## 2018-07-06 PROCEDURE — C9898 INPNT STAY RADIOLABELED ITEM: HCPCS

## 2018-07-06 PROCEDURE — 83880 ASSAY OF NATRIURETIC PEPTIDE: CPT

## 2018-07-06 PROCEDURE — 33208 INSRT HEART PM ATRIAL & VENT: CPT

## 2018-07-06 PROCEDURE — 80048 BASIC METABOLIC PNL TOTAL CA: CPT

## 2018-07-06 PROCEDURE — 85730 THROMBOPLASTIN TIME PARTIAL: CPT

## 2018-07-06 PROCEDURE — A9540 TC99M MAA: HCPCS

## 2018-07-06 PROCEDURE — 99285 EMERGENCY DEPT VISIT HI MDM: CPT

## 2018-07-06 PROCEDURE — 71275 CT ANGIOGRAPHY CHEST: CPT

## 2018-07-06 PROCEDURE — 82040 ASSAY OF SERUM ALBUMIN: CPT

## 2018-07-06 PROCEDURE — 83735 ASSAY OF MAGNESIUM: CPT

## 2018-07-06 PROCEDURE — 36415 COLL VENOUS BLD VENIPUNCTURE: CPT

## 2018-07-06 PROCEDURE — 80053 COMPREHEN METABOLIC PANEL: CPT

## 2018-07-06 PROCEDURE — 93005 ELECTROCARDIOGRAM TRACING: CPT

## 2018-07-06 PROCEDURE — 87040 BLOOD CULTURE FOR BACTERIA: CPT

## 2018-07-06 PROCEDURE — 85025 COMPLETE CBC W/AUTO DIFF WBC: CPT

## 2018-07-06 PROCEDURE — 85610 PROTHROMBIN TIME: CPT

## 2018-07-06 RX ADMIN — TIZANIDINE SCH MG: 4 TABLET ORAL at 21:18

## 2018-07-06 RX ADMIN — HEPARIN SODIUM SCH UNITS: 5000 INJECTION, SOLUTION INTRAVENOUS; SUBCUTANEOUS at 23:16

## 2018-07-06 RX ADMIN — IPRATROPIUM BROMIDE AND ALBUTEROL SULFATE SCH ML: 2.5; .5 SOLUTION RESPIRATORY (INHALATION) at 11:35

## 2018-07-06 RX ADMIN — OXYCODONE HYDROCHLORIDE PRN MG: 5 TABLET ORAL at 23:27

## 2018-07-06 RX ADMIN — SODIUM CHLORIDE, PRESERVATIVE FREE SCH ML: 5 INJECTION INTRAVENOUS at 21:19

## 2018-07-06 RX ADMIN — METHYLPREDNISOLONE SODIUM SUCCINATE SCH MG: 125 INJECTION, POWDER, FOR SOLUTION INTRAMUSCULAR; INTRAVENOUS at 15:04

## 2018-07-06 RX ADMIN — HEPARIN SODIUM SCH UNITS: 5000 INJECTION, SOLUTION INTRAVENOUS; SUBCUTANEOUS at 15:03

## 2018-07-06 RX ADMIN — METOPROLOL TARTRATE SCH MG: 25 TABLET, FILM COATED ORAL at 21:18

## 2018-07-06 RX ADMIN — POTASSIUM CHLORIDE SCH MEQ: 20 TABLET, EXTENDED RELEASE ORAL at 16:32

## 2018-07-06 RX ADMIN — METHYLPREDNISOLONE SODIUM SUCCINATE SCH MG: 125 INJECTION, POWDER, FOR SOLUTION INTRAMUSCULAR; INTRAVENOUS at 21:18

## 2018-07-06 RX ADMIN — OXYCODONE HYDROCHLORIDE PRN MG: 5 TABLET ORAL at 18:40

## 2018-07-07 VITALS — SYSTOLIC BLOOD PRESSURE: 161 MMHG | DIASTOLIC BLOOD PRESSURE: 71 MMHG

## 2018-07-07 VITALS — DIASTOLIC BLOOD PRESSURE: 66 MMHG | SYSTOLIC BLOOD PRESSURE: 149 MMHG

## 2018-07-07 VITALS — SYSTOLIC BLOOD PRESSURE: 165 MMHG | DIASTOLIC BLOOD PRESSURE: 78 MMHG

## 2018-07-07 VITALS — SYSTOLIC BLOOD PRESSURE: 146 MMHG | DIASTOLIC BLOOD PRESSURE: 61 MMHG

## 2018-07-07 VITALS — SYSTOLIC BLOOD PRESSURE: 162 MMHG | DIASTOLIC BLOOD PRESSURE: 76 MMHG

## 2018-07-07 VITALS — DIASTOLIC BLOOD PRESSURE: 65 MMHG | SYSTOLIC BLOOD PRESSURE: 169 MMHG

## 2018-07-07 VITALS — SYSTOLIC BLOOD PRESSURE: 169 MMHG | DIASTOLIC BLOOD PRESSURE: 69 MMHG

## 2018-07-07 VITALS — DIASTOLIC BLOOD PRESSURE: 81 MMHG | SYSTOLIC BLOOD PRESSURE: 180 MMHG

## 2018-07-07 VITALS — SYSTOLIC BLOOD PRESSURE: 159 MMHG | DIASTOLIC BLOOD PRESSURE: 69 MMHG

## 2018-07-07 VITALS — DIASTOLIC BLOOD PRESSURE: 62 MMHG | SYSTOLIC BLOOD PRESSURE: 118 MMHG

## 2018-07-07 LAB
<PLATELET ESTIMATE>: ADEQUATE
ALBUMIN SERPL-MCNC: 3 G/DL (ref 3.4–5)
ALP SERPL-CCNC: 80 U/L (ref 45–117)
ALT SERPL-CCNC: 68 U/L (ref 12–78)
ANION GAP SERPL CALC-SCNC: 7 MMOL/L (ref 5–15)
ANISOCYTOSIS BLD QL SMEAR: (no result)
BILIRUB SERPL-MCNC: 0.4 MG/DL (ref 0.2–1)
CALCIUM SERPL-MCNC: 8.4 MG/DL (ref 8.5–10.1)
CHLORIDE SERPL-SCNC: 101 MMOL/L (ref 98–107)
CREAT SERPL-MCNC: 1.15 MG/DL (ref 0.7–1.3)
ERYTHROCYTE [DISTWIDTH] IN BLOOD BY AUTOMATED COUNT: 22.8 % (ref 9.4–14.8)
HYPOCHROMIA BLD QL SMEAR: (no result)
LG PLATELETS BLD QL SMEAR: (no result)
LYMPH#(MANUAL): 0.45 X10^3/UL (ref 1–3.4)
LYMPHS% (MANUAL): 15 % (ref 22–44)
MCH RBC QN AUTO: 28.2 PG (ref 27.5–34.5)
MCHC RBC AUTO-ENTMCNC: 32.8 G/DL (ref 33.2–36.2)
MCV RBC AUTO: 86 FL (ref 81–97)
MD: YES
MONOS#(MANUAL): 0.09 X10^3/UL (ref 0.3–2.7)
MONOS% (MANUAL): 3 % (ref 2–9)
OVALOCYTES BLD QL SMEAR: (no result)
PLATELET # BLD AUTO: 264 X10^3/UL (ref 130–400)
PMV BLD AUTO: 9.9 FL (ref 7.4–10.4)
PROT SERPL-MCNC: 5.9 G/DL (ref 6.4–8.2)
RBC # BLD AUTO: 3.37 X10^6/UL (ref 4.38–5.82)
REACTIVE LYMPHS # (MANUAL): 0.03 X10^3/UL (ref 0–0)
REACTIVE LYMPHS % (MANUAL): 1 % (ref 0–0)
SEG#(MANUAL): 2.43 X10^3/UL (ref 1.8–6.8)
SEGS% (MANUAL): 81 % (ref 42–75)
TROPONIN I SERPL-MCNC: < 0.015 NG/ML (ref 0–0.04)
TSH SERPL-ACNC: 0.62 MIU/L (ref 0.36–3.74)

## 2018-07-07 RX ADMIN — LISINOPRIL SCH MG: 20 TABLET ORAL at 07:57

## 2018-07-07 RX ADMIN — POTASSIUM CHLORIDE SCH MEQ: 20 TABLET, EXTENDED RELEASE ORAL at 07:56

## 2018-07-07 RX ADMIN — OXYCODONE HYDROCHLORIDE PRN MG: 5 TABLET ORAL at 15:54

## 2018-07-07 RX ADMIN — METHYLPREDNISOLONE SODIUM SUCCINATE SCH MG: 125 INJECTION, POWDER, FOR SOLUTION INTRAMUSCULAR; INTRAVENOUS at 08:03

## 2018-07-07 RX ADMIN — METOPROLOL TARTRATE SCH MG: 25 TABLET, FILM COATED ORAL at 22:05

## 2018-07-07 RX ADMIN — METHYLPREDNISOLONE SODIUM SUCCINATE SCH MG: 125 INJECTION, POWDER, FOR SOLUTION INTRAMUSCULAR; INTRAVENOUS at 03:09

## 2018-07-07 RX ADMIN — OXYCODONE HYDROCHLORIDE PRN MG: 5 TABLET ORAL at 20:18

## 2018-07-07 RX ADMIN — OXYCODONE HYDROCHLORIDE PRN MG: 5 TABLET ORAL at 12:20

## 2018-07-07 RX ADMIN — TAMSULOSIN HYDROCHLORIDE SCH MG: 0.4 CAPSULE ORAL at 07:56

## 2018-07-07 RX ADMIN — SODIUM CHLORIDE, PRESERVATIVE FREE SCH ML: 5 INJECTION INTRAVENOUS at 08:03

## 2018-07-07 RX ADMIN — TIZANIDINE SCH MG: 4 TABLET ORAL at 22:07

## 2018-07-07 RX ADMIN — HEPARIN SODIUM SCH UNITS: 5000 INJECTION, SOLUTION INTRAVENOUS; SUBCUTANEOUS at 15:54

## 2018-07-07 RX ADMIN — Medication SCH MG: at 07:57

## 2018-07-07 RX ADMIN — METOPROLOL TARTRATE SCH MG: 25 TABLET, FILM COATED ORAL at 11:03

## 2018-07-07 RX ADMIN — FOLIC ACID SCH MG: 1 TABLET ORAL at 08:03

## 2018-07-07 RX ADMIN — POTASSIUM CHLORIDE SCH MEQ: 20 TABLET, EXTENDED RELEASE ORAL at 15:54

## 2018-07-07 RX ADMIN — OXYCODONE HYDROCHLORIDE PRN MG: 5 TABLET ORAL at 04:30

## 2018-07-07 RX ADMIN — FUROSEMIDE SCH MG: 10 INJECTION, SOLUTION INTRAMUSCULAR; INTRAVENOUS at 08:03

## 2018-07-07 RX ADMIN — SODIUM CHLORIDE, PRESERVATIVE FREE SCH ML: 5 INJECTION INTRAVENOUS at 20:17

## 2018-07-07 RX ADMIN — HEPARIN SODIUM SCH UNITS: 5000 INJECTION, SOLUTION INTRAVENOUS; SUBCUTANEOUS at 08:09

## 2018-07-07 RX ADMIN — OXYCODONE HYDROCHLORIDE PRN MG: 5 TABLET ORAL at 08:00

## 2018-07-08 VITALS — DIASTOLIC BLOOD PRESSURE: 68 MMHG | SYSTOLIC BLOOD PRESSURE: 165 MMHG

## 2018-07-08 VITALS — SYSTOLIC BLOOD PRESSURE: 161 MMHG | DIASTOLIC BLOOD PRESSURE: 65 MMHG

## 2018-07-08 VITALS — DIASTOLIC BLOOD PRESSURE: 71 MMHG | SYSTOLIC BLOOD PRESSURE: 165 MMHG

## 2018-07-08 VITALS — SYSTOLIC BLOOD PRESSURE: 149 MMHG | DIASTOLIC BLOOD PRESSURE: 56 MMHG

## 2018-07-08 LAB
<PLATELET ESTIMATE>: ADEQUATE
<PLT MORPHOLOGY>: (no result)
ANION GAP SERPL CALC-SCNC: 7 MMOL/L (ref 5–15)
ANISOCYTOSIS BLD QL SMEAR: (no result)
CALCIUM SERPL-MCNC: 9.1 MG/DL (ref 8.5–10.1)
CHLORIDE SERPL-SCNC: 102 MMOL/L (ref 98–107)
CREAT SERPL-MCNC: 0.93 MG/DL (ref 0.7–1.3)
ERYTHROCYTE [DISTWIDTH] IN BLOOD BY AUTOMATED COUNT: 23.5 % (ref 9.4–14.8)
HYPOCHROMIA BLD QL SMEAR: (no result)
LYMPH#(MANUAL): 1.22 X10^3/UL (ref 1–3.4)
LYMPHS% (MANUAL): 17 % (ref 22–44)
MCH RBC QN AUTO: 28 PG (ref 27.5–34.5)
MCHC RBC AUTO-ENTMCNC: 32.5 G/DL (ref 33.2–36.2)
MCV RBC AUTO: 86.4 FL (ref 81–97)
MD: YES
MONOS#(MANUAL): 0.94 X10^3/UL (ref 0.3–2.7)
MONOS% (MANUAL): 13 % (ref 2–9)
OVALOCYTES BLD QL SMEAR: (no result)
PLATELET # BLD AUTO: 262 X10^3/UL (ref 130–400)
PMV BLD AUTO: 9.9 FL (ref 7.4–10.4)
RBC # BLD AUTO: 3.23 X10^6/UL (ref 4.38–5.82)
SEG#(MANUAL): 5.04 X10^3/UL (ref 1.8–6.8)
SEGS% (MANUAL): 70 % (ref 42–75)

## 2018-07-08 RX ADMIN — TIZANIDINE SCH MG: 4 TABLET ORAL at 21:02

## 2018-07-08 RX ADMIN — HEPARIN SODIUM SCH UNITS: 5000 INJECTION, SOLUTION INTRAVENOUS; SUBCUTANEOUS at 00:06

## 2018-07-08 RX ADMIN — ENOXAPARIN SODIUM SCH MG: 40 INJECTION SUBCUTANEOUS at 16:04

## 2018-07-08 RX ADMIN — SPIRONOLACTONE SCH MG: 25 TABLET ORAL at 16:28

## 2018-07-08 RX ADMIN — LISINOPRIL SCH MG: 20 TABLET ORAL at 08:35

## 2018-07-08 RX ADMIN — OXYCODONE HYDROCHLORIDE PRN MG: 5 TABLET ORAL at 08:37

## 2018-07-08 RX ADMIN — OXYCODONE HYDROCHLORIDE PRN MG: 5 TABLET ORAL at 21:03

## 2018-07-08 RX ADMIN — POTASSIUM CHLORIDE SCH MEQ: 20 TABLET, EXTENDED RELEASE ORAL at 08:36

## 2018-07-08 RX ADMIN — TAMSULOSIN HYDROCHLORIDE SCH MG: 0.4 CAPSULE ORAL at 08:35

## 2018-07-08 RX ADMIN — OXYCODONE HYDROCHLORIDE PRN MG: 5 TABLET ORAL at 04:58

## 2018-07-08 RX ADMIN — Medication SCH MG: at 08:36

## 2018-07-08 RX ADMIN — FOLIC ACID SCH MG: 1 TABLET ORAL at 08:36

## 2018-07-08 RX ADMIN — SODIUM CHLORIDE, PRESERVATIVE FREE SCH ML: 5 INJECTION INTRAVENOUS at 21:03

## 2018-07-08 RX ADMIN — OXYCODONE HYDROCHLORIDE PRN MG: 5 TABLET ORAL at 16:28

## 2018-07-08 RX ADMIN — SODIUM CHLORIDE, PRESERVATIVE FREE SCH ML: 5 INJECTION INTRAVENOUS at 08:36

## 2018-07-08 RX ADMIN — FUROSEMIDE SCH MG: 10 INJECTION, SOLUTION INTRAMUSCULAR; INTRAVENOUS at 08:36

## 2018-07-08 RX ADMIN — OXYCODONE HYDROCHLORIDE PRN MG: 5 TABLET ORAL at 12:56

## 2018-07-09 VITALS — SYSTOLIC BLOOD PRESSURE: 101 MMHG | DIASTOLIC BLOOD PRESSURE: 59 MMHG

## 2018-07-09 VITALS — DIASTOLIC BLOOD PRESSURE: 60 MMHG | SYSTOLIC BLOOD PRESSURE: 110 MMHG

## 2018-07-09 VITALS — SYSTOLIC BLOOD PRESSURE: 156 MMHG | DIASTOLIC BLOOD PRESSURE: 66 MMHG

## 2018-07-09 VITALS — SYSTOLIC BLOOD PRESSURE: 188 MMHG | DIASTOLIC BLOOD PRESSURE: 74 MMHG

## 2018-07-09 VITALS — DIASTOLIC BLOOD PRESSURE: 65 MMHG | SYSTOLIC BLOOD PRESSURE: 127 MMHG

## 2018-07-09 VITALS — DIASTOLIC BLOOD PRESSURE: 58 MMHG | SYSTOLIC BLOOD PRESSURE: 154 MMHG

## 2018-07-09 VITALS — SYSTOLIC BLOOD PRESSURE: 154 MMHG | DIASTOLIC BLOOD PRESSURE: 54 MMHG

## 2018-07-09 LAB
<PLATELET ESTIMATE>: ADEQUATE
<PLT MORPHOLOGY>: (no result)
ALBUMIN SERPL-MCNC: 3.1 G/DL (ref 3.4–5)
ALP SERPL-CCNC: 76 U/L (ref 45–117)
ALT SERPL-CCNC: 54 U/L (ref 12–78)
ANION GAP SERPL CALC-SCNC: 5 MMOL/L (ref 5–15)
ANISOCYTOSIS BLD QL SMEAR: (no result)
BILIRUB SERPL-MCNC: 0.4 MG/DL (ref 0.2–1)
CALCIUM SERPL-MCNC: 8.8 MG/DL (ref 8.5–10.1)
CHLORIDE SERPL-SCNC: 100 MMOL/L (ref 98–107)
CREAT SERPL-MCNC: 0.98 MG/DL (ref 0.7–1.3)
EOS#(MANUAL): 0.08 X10^3/UL (ref 0–0.4)
EOS% (MANUAL): 1 % (ref 1–7)
ERYTHROCYTE [DISTWIDTH] IN BLOOD BY AUTOMATED COUNT: 23 % (ref 9.4–14.8)
HYPOCHROMIA BLD QL SMEAR: (no result)
LYMPH#(MANUAL): 1.9 X10^3/UL (ref 1–3.4)
LYMPHS% (MANUAL): 24 % (ref 22–44)
MCH RBC QN AUTO: 28.5 PG (ref 27.5–34.5)
MCHC RBC AUTO-ENTMCNC: 32.9 G/DL (ref 33.2–36.2)
MCV RBC AUTO: 86.7 FL (ref 81–97)
MD: YES
MONOS#(MANUAL): 1.19 X10^3/UL (ref 0.3–2.7)
MONOS% (MANUAL): 15 % (ref 2–9)
OVALOCYTES BLD QL SMEAR: (no result)
PLATELET # BLD AUTO: 260 X10^3/UL (ref 130–400)
PMV BLD AUTO: 10.4 FL (ref 7.4–10.4)
PROT SERPL-MCNC: 5.7 G/DL (ref 6.4–8.2)
RBC # BLD AUTO: 3.22 X10^6/UL (ref 4.38–5.82)
SEG#(MANUAL): 4.74 X10^3/UL (ref 1.8–6.8)
SEGS% (MANUAL): 60 % (ref 42–75)

## 2018-07-09 RX ADMIN — TIZANIDINE SCH MG: 4 TABLET ORAL at 21:14

## 2018-07-09 RX ADMIN — OXYCODONE HYDROCHLORIDE PRN MG: 5 TABLET ORAL at 17:21

## 2018-07-09 RX ADMIN — SODIUM CHLORIDE SCH MLS/HR: 0.9 INJECTION, SOLUTION INTRAVENOUS at 11:49

## 2018-07-09 RX ADMIN — ENOXAPARIN SODIUM SCH MG: 40 INJECTION SUBCUTANEOUS at 13:00

## 2018-07-09 RX ADMIN — OXYCODONE HYDROCHLORIDE PRN MG: 5 TABLET ORAL at 12:50

## 2018-07-09 RX ADMIN — OXYCODONE HYDROCHLORIDE PRN MG: 5 TABLET ORAL at 01:49

## 2018-07-09 RX ADMIN — SPIRONOLACTONE SCH MG: 25 TABLET ORAL at 21:14

## 2018-07-09 RX ADMIN — TAMSULOSIN HYDROCHLORIDE SCH MG: 0.4 CAPSULE ORAL at 08:41

## 2018-07-09 RX ADMIN — SODIUM CHLORIDE SCH MLS/HR: 0.9 INJECTION, SOLUTION INTRAVENOUS at 19:49

## 2018-07-09 RX ADMIN — Medication SCH MG: at 08:29

## 2018-07-09 RX ADMIN — SODIUM CHLORIDE, PRESERVATIVE FREE SCH ML: 5 INJECTION INTRAVENOUS at 21:18

## 2018-07-09 RX ADMIN — SODIUM CHLORIDE, PRESERVATIVE FREE SCH ML: 5 INJECTION INTRAVENOUS at 08:41

## 2018-07-09 RX ADMIN — FUROSEMIDE SCH MG: 10 INJECTION, SOLUTION INTRAMUSCULAR; INTRAVENOUS at 08:40

## 2018-07-09 RX ADMIN — SPIRONOLACTONE SCH MG: 25 TABLET ORAL at 08:41

## 2018-07-09 RX ADMIN — Medication SCH MG: at 08:41

## 2018-07-09 RX ADMIN — FOLIC ACID SCH MG: 1 TABLET ORAL at 08:41

## 2018-07-09 RX ADMIN — LISINOPRIL SCH MG: 20 TABLET ORAL at 08:42

## 2018-07-09 RX ADMIN — OXYCODONE HYDROCHLORIDE PRN MG: 5 TABLET ORAL at 22:20

## 2018-07-09 RX ADMIN — OXYCODONE HYDROCHLORIDE PRN MG: 5 TABLET ORAL at 06:08

## 2018-07-09 RX ADMIN — Medication SCH MG: at 09:47

## 2018-07-10 VITALS — DIASTOLIC BLOOD PRESSURE: 66 MMHG | SYSTOLIC BLOOD PRESSURE: 142 MMHG

## 2018-07-10 VITALS — SYSTOLIC BLOOD PRESSURE: 177 MMHG | DIASTOLIC BLOOD PRESSURE: 68 MMHG

## 2018-07-10 VITALS — SYSTOLIC BLOOD PRESSURE: 147 MMHG | DIASTOLIC BLOOD PRESSURE: 81 MMHG

## 2018-07-10 LAB
ANION GAP SERPL CALC-SCNC: 6 MMOL/L (ref 5–15)
CALCIUM SERPL-MCNC: 9.4 MG/DL (ref 8.5–10.1)
CHLORIDE SERPL-SCNC: 99 MMOL/L (ref 98–107)
CREAT SERPL-MCNC: 0.98 MG/DL (ref 0.7–1.3)

## 2018-07-10 PROCEDURE — 02H63JZ INSERTION OF PACEMAKER LEAD INTO RIGHT ATRIUM, PERCUTANEOUS APPROACH: ICD-10-PCS | Performed by: INTERNAL MEDICINE

## 2018-07-10 PROCEDURE — 0W380ZZ CONTROL BLEEDING IN CHEST WALL, OPEN APPROACH: ICD-10-PCS | Performed by: INTERNAL MEDICINE

## 2018-07-10 PROCEDURE — 02HK3JZ INSERTION OF PACEMAKER LEAD INTO RIGHT VENTRICLE, PERCUTANEOUS APPROACH: ICD-10-PCS | Performed by: INTERNAL MEDICINE

## 2018-07-10 PROCEDURE — 0JH606Z INSERTION OF PACEMAKER, DUAL CHAMBER INTO CHEST SUBCUTANEOUS TISSUE AND FASCIA, OPEN APPROACH: ICD-10-PCS | Performed by: INTERNAL MEDICINE

## 2018-07-10 RX ADMIN — SODIUM CHLORIDE, PRESERVATIVE FREE SCH ML: 5 INJECTION INTRAVENOUS at 21:00

## 2018-07-10 RX ADMIN — SODIUM CHLORIDE, PRESERVATIVE FREE SCH ML: 5 INJECTION INTRAVENOUS at 08:34

## 2018-07-10 RX ADMIN — TIZANIDINE SCH MG: 4 TABLET ORAL at 21:00

## 2018-07-10 RX ADMIN — SPIRONOLACTONE SCH MG: 25 TABLET ORAL at 08:25

## 2018-07-10 RX ADMIN — OXYCODONE HYDROCHLORIDE PRN MG: 5 TABLET ORAL at 16:00

## 2018-07-10 RX ADMIN — TAMSULOSIN HYDROCHLORIDE SCH MG: 0.4 CAPSULE ORAL at 08:25

## 2018-07-10 RX ADMIN — Medication SCH MG: at 05:26

## 2018-07-10 RX ADMIN — ENOXAPARIN SODIUM SCH MG: 40 INJECTION SUBCUTANEOUS at 15:12

## 2018-07-10 RX ADMIN — OXYCODONE HYDROCHLORIDE PRN MG: 5 TABLET ORAL at 03:55

## 2018-07-10 RX ADMIN — FOLIC ACID SCH MG: 1 TABLET ORAL at 08:26

## 2018-07-10 RX ADMIN — SODIUM CHLORIDE SCH MLS/HR: 0.9 INJECTION, SOLUTION INTRAVENOUS at 03:49

## 2018-07-10 RX ADMIN — Medication SCH MG: at 08:26

## 2018-07-10 RX ADMIN — LISINOPRIL SCH MG: 20 TABLET ORAL at 08:31

## 2018-07-10 RX ADMIN — OXYCODONE HYDROCHLORIDE PRN MG: 5 TABLET ORAL at 08:26

## 2018-07-10 RX ADMIN — FUROSEMIDE SCH MG: 10 INJECTION, SOLUTION INTRAMUSCULAR; INTRAVENOUS at 08:25

## 2018-07-10 RX ADMIN — SODIUM CHLORIDE SCH MLS/HR: 0.9 INJECTION, SOLUTION INTRAVENOUS at 08:26

## 2018-07-10 RX ADMIN — SPIRONOLACTONE SCH MG: 25 TABLET ORAL at 21:00

## 2018-07-10 RX ADMIN — OXYCODONE HYDROCHLORIDE PRN MG: 5 TABLET ORAL at 23:13

## 2018-07-11 VITALS — DIASTOLIC BLOOD PRESSURE: 77 MMHG | SYSTOLIC BLOOD PRESSURE: 127 MMHG

## 2018-07-11 VITALS — DIASTOLIC BLOOD PRESSURE: 59 MMHG | SYSTOLIC BLOOD PRESSURE: 96 MMHG

## 2018-07-11 VITALS — SYSTOLIC BLOOD PRESSURE: 134 MMHG | DIASTOLIC BLOOD PRESSURE: 69 MMHG

## 2018-07-11 VITALS — DIASTOLIC BLOOD PRESSURE: 65 MMHG | SYSTOLIC BLOOD PRESSURE: 120 MMHG

## 2018-07-11 LAB
<PLATELET ESTIMATE>: ADEQUATE
<PLATELET ESTIMATE>: ADEQUATE
<PLT MORPHOLOGY>: (no result)
ALBUMIN SERPL-MCNC: 2.9 G/DL (ref 3.4–5)
ALP SERPL-CCNC: 75 U/L (ref 45–117)
ALT SERPL-CCNC: 44 U/L (ref 12–78)
ANION GAP SERPL CALC-SCNC: 6 MMOL/L (ref 5–15)
ANION GAP SERPL CALC-SCNC: 8 MMOL/L (ref 5–15)
ANISOCYTOSIS BLD QL SMEAR: (no result)
ANISOCYTOSIS BLD QL SMEAR: (no result)
BAND#(MANUAL): 0.29 X10^3/UL
BILIRUB SERPL-MCNC: 0.3 MG/DL (ref 0.2–1)
CALCIUM SERPL-MCNC: 8.3 MG/DL (ref 8.5–10.1)
CALCIUM SERPL-MCNC: 8.8 MG/DL (ref 8.5–10.1)
CHLORIDE SERPL-SCNC: 101 MMOL/L (ref 98–107)
CHLORIDE SERPL-SCNC: 99 MMOL/L (ref 98–107)
CREAT SERPL-MCNC: 1.17 MG/DL (ref 0.7–1.3)
CREAT SERPL-MCNC: 1.19 MG/DL (ref 0.7–1.3)
EOS#(MANUAL): 0.1 X10^3/UL (ref 0–0.4)
EOS% (MANUAL): 1 % (ref 1–7)
ERYTHROCYTE [DISTWIDTH] IN BLOOD BY AUTOMATED COUNT: 22.8 % (ref 9.4–14.8)
ERYTHROCYTE [DISTWIDTH] IN BLOOD BY AUTOMATED COUNT: 22.8 % (ref 9.4–14.8)
HYPOCHROMIA BLD QL SMEAR: (no result)
HYPOCHROMIA BLD QL SMEAR: (no result)
LG PLATELETS BLD QL SMEAR: (no result)
LYMPH#(MANUAL): 1.62 X10^3/UL (ref 1–3.4)
LYMPH#(MANUAL): 1.98 X10^3/UL (ref 1–3.4)
LYMPHS% (MANUAL): 11 % (ref 22–44)
LYMPHS% (MANUAL): 19 % (ref 22–44)
MCH RBC QN AUTO: 27.7 PG (ref 27.5–34.5)
MCH RBC QN AUTO: 27.9 PG (ref 27.5–34.5)
MCHC RBC AUTO-ENTMCNC: 32.4 G/DL (ref 33.2–36.2)
MCHC RBC AUTO-ENTMCNC: 32.6 G/DL (ref 33.2–36.2)
MCV RBC AUTO: 85.5 FL (ref 81–97)
MCV RBC AUTO: 85.6 FL (ref 81–97)
MD: YES
MD: YES
MONOS#(MANUAL): 0.15 X10^3/UL (ref 0.3–2.7)
MONOS#(MANUAL): 0.83 X10^3/UL (ref 0.3–2.7)
MONOS% (MANUAL): 1 % (ref 2–9)
MONOS% (MANUAL): 8 % (ref 2–9)
NEUTS BAND NFR BLD: 2 % (ref 0–7)
OVALOCYTES BLD QL SMEAR: (no result)
OVALOCYTES BLD QL SMEAR: (no result)
PLATELET # BLD AUTO: 248 X10^3/UL (ref 130–400)
PLATELET # BLD AUTO: 250 X10^3/UL (ref 130–400)
PMV BLD AUTO: 9.3 FL (ref 7.4–10.4)
PMV BLD AUTO: 9.8 FL (ref 7.4–10.4)
PROT SERPL-MCNC: 5.4 G/DL (ref 6.4–8.2)
RBC # BLD AUTO: 2.95 X10^6/UL (ref 4.38–5.82)
RBC # BLD AUTO: 3.08 X10^6/UL (ref 4.38–5.82)
SCHISTOCYTES BLD QL SMEAR: (no result)
SEG#(MANUAL): 12.64 X10^3/UL (ref 1.8–6.8)
SEG#(MANUAL): 7.49 X10^3/UL (ref 1.8–6.8)
SEGS% (MANUAL): 72 % (ref 42–75)
SEGS% (MANUAL): 86 % (ref 42–75)

## 2018-07-11 RX ADMIN — SODIUM CHLORIDE, PRESERVATIVE FREE SCH ML: 5 INJECTION INTRAVENOUS at 21:00

## 2018-07-11 RX ADMIN — OXYCODONE HYDROCHLORIDE PRN MG: 5 TABLET ORAL at 08:19

## 2018-07-11 RX ADMIN — FOLIC ACID SCH MG: 1 TABLET ORAL at 08:20

## 2018-07-11 RX ADMIN — OXYCODONE HYDROCHLORIDE PRN MG: 5 TABLET ORAL at 21:04

## 2018-07-11 RX ADMIN — Medication SCH MG: at 08:20

## 2018-07-11 RX ADMIN — LISINOPRIL SCH MG: 20 TABLET ORAL at 08:20

## 2018-07-11 RX ADMIN — CEFAZOLIN SODIUM SCH MLS/HR: 1 SOLUTION INTRAVENOUS at 05:09

## 2018-07-11 RX ADMIN — Medication SCH MG: at 06:55

## 2018-07-11 RX ADMIN — SPIRONOLACTONE SCH MG: 25 TABLET ORAL at 21:02

## 2018-07-11 RX ADMIN — CEFAZOLIN SODIUM SCH MLS/HR: 1 SOLUTION INTRAVENOUS at 21:02

## 2018-07-11 RX ADMIN — SODIUM CHLORIDE, PRESERVATIVE FREE SCH ML: 5 INJECTION INTRAVENOUS at 21:04

## 2018-07-11 RX ADMIN — FUROSEMIDE SCH MG: 10 INJECTION, SOLUTION INTRAMUSCULAR; INTRAVENOUS at 08:20

## 2018-07-11 RX ADMIN — TIZANIDINE SCH MG: 4 TABLET ORAL at 21:04

## 2018-07-11 RX ADMIN — TAMSULOSIN HYDROCHLORIDE SCH MG: 0.4 CAPSULE ORAL at 08:19

## 2018-07-11 RX ADMIN — OXYCODONE HYDROCHLORIDE PRN MG: 5 TABLET ORAL at 16:46

## 2018-07-11 RX ADMIN — CEFAZOLIN SODIUM SCH MLS/HR: 1 SOLUTION INTRAVENOUS at 12:46

## 2018-07-11 RX ADMIN — SPIRONOLACTONE SCH MG: 25 TABLET ORAL at 08:20

## 2018-07-11 RX ADMIN — SODIUM CHLORIDE, PRESERVATIVE FREE SCH ML: 5 INJECTION INTRAVENOUS at 08:21

## 2018-07-11 RX ADMIN — OXYCODONE HYDROCHLORIDE PRN MG: 5 TABLET ORAL at 12:46

## 2018-07-11 RX ADMIN — OXYCODONE HYDROCHLORIDE PRN MG: 5 TABLET ORAL at 04:28

## 2018-07-12 VITALS — DIASTOLIC BLOOD PRESSURE: 59 MMHG | SYSTOLIC BLOOD PRESSURE: 141 MMHG

## 2018-07-12 VITALS — SYSTOLIC BLOOD PRESSURE: 108 MMHG | DIASTOLIC BLOOD PRESSURE: 64 MMHG

## 2018-07-12 VITALS — DIASTOLIC BLOOD PRESSURE: 68 MMHG | SYSTOLIC BLOOD PRESSURE: 123 MMHG

## 2018-07-12 VITALS — DIASTOLIC BLOOD PRESSURE: 64 MMHG | SYSTOLIC BLOOD PRESSURE: 114 MMHG

## 2018-07-12 VITALS — DIASTOLIC BLOOD PRESSURE: 57 MMHG | SYSTOLIC BLOOD PRESSURE: 137 MMHG

## 2018-07-12 VITALS — DIASTOLIC BLOOD PRESSURE: 67 MMHG | SYSTOLIC BLOOD PRESSURE: 108 MMHG

## 2018-07-12 VITALS — SYSTOLIC BLOOD PRESSURE: 67 MMHG | DIASTOLIC BLOOD PRESSURE: 38 MMHG

## 2018-07-12 VITALS — DIASTOLIC BLOOD PRESSURE: 74 MMHG | SYSTOLIC BLOOD PRESSURE: 138 MMHG

## 2018-07-12 LAB
<PLATELET ESTIMATE>: ADEQUATE
<PLT MORPHOLOGY>: (no result)
ALBUMIN SERPL-MCNC: 2.6 G/DL (ref 3.4–5)
ANION GAP SERPL CALC-SCNC: 3 MMOL/L (ref 5–15)
ANISOCYTOSIS BLD QL SMEAR: (no result)
APTT BLD: 25 SECONDS (ref 25–31)
CALCIUM SERPL-MCNC: 8.1 MG/DL (ref 8.5–10.1)
CHLORIDE SERPL-SCNC: 102 MMOL/L (ref 98–107)
CREAT SERPL-MCNC: 1.16 MG/DL (ref 0.7–1.3)
EOS#(MANUAL): 0.11 X10^3/UL (ref 0–0.4)
EOS% (MANUAL): 1 % (ref 1–7)
ERYTHROCYTE [DISTWIDTH] IN BLOOD BY AUTOMATED COUNT: 21 % (ref 9.4–14.8)
ERYTHROCYTE [DISTWIDTH] IN BLOOD BY AUTOMATED COUNT: 22.7 % (ref 9.4–14.8)
ERYTHROCYTE [DISTWIDTH] IN BLOOD BY AUTOMATED COUNT: 22.9 % (ref 9.4–14.8)
HYPOCHROMIA BLD QL SMEAR: (no result)
INR PPP: 0.97 (ref 0.93–1.1)
LYMPH#(MANUAL): 0.75 X10^3/UL (ref 1–3.4)
LYMPH#(MANUAL): 1.01 X10^3/UL (ref 1–3.4)
LYMPH#(MANUAL): 1.5 X10^3/UL (ref 1–3.4)
LYMPHS% (MANUAL): 11 % (ref 22–44)
LYMPHS% (MANUAL): 13 % (ref 22–44)
LYMPHS% (MANUAL): 7 % (ref 22–44)
MCH RBC QN AUTO: 28.1 PG (ref 27.5–34.5)
MCH RBC QN AUTO: 28.2 PG (ref 27.5–34.5)
MCH RBC QN AUTO: 28.7 PG (ref 27.5–34.5)
MCHC RBC AUTO-ENTMCNC: 32.5 G/DL (ref 33.2–36.2)
MCHC RBC AUTO-ENTMCNC: 32.6 G/DL (ref 33.2–36.2)
MCHC RBC AUTO-ENTMCNC: 32.8 G/DL (ref 33.2–36.2)
MCV RBC AUTO: 86.3 FL (ref 81–97)
MCV RBC AUTO: 86.6 FL (ref 81–97)
MCV RBC AUTO: 87.5 FL (ref 81–97)
MD: YES
METAMYELOCYTES# (MANUAL): 0.12 X10^3/UL (ref 0–0)
METAMYELOCYTES% (MANUAL): 1 % (ref 0–1)
MONOS#(MANUAL): 0.64 X10^3/UL (ref 0.3–2.7)
MONOS#(MANUAL): 0.64 X10^3/UL (ref 0.3–2.7)
MONOS#(MANUAL): 0.69 X10^3/UL (ref 0.3–2.7)
MONOS% (MANUAL): 6 % (ref 2–9)
MONOS% (MANUAL): 6 % (ref 2–9)
MONOS% (MANUAL): 7 % (ref 2–9)
OVALOCYTES BLD QL SMEAR: (no result)
PLATELET # BLD AUTO: 216 X10^3/UL (ref 130–400)
PLATELET # BLD AUTO: 218 X10^3/UL (ref 130–400)
PLATELET # BLD AUTO: 224 X10^3/UL (ref 130–400)
PMV BLD AUTO: 9.4 FL (ref 7.4–10.4)
PMV BLD AUTO: 9.6 FL (ref 7.4–10.4)
PMV BLD AUTO: 9.7 FL (ref 7.4–10.4)
PROTHROMBIN TIME: 10.1 SECONDS (ref 9.6–11.5)
RBC # BLD AUTO: 2.49 X10^6/UL (ref 4.38–5.82)
RBC # BLD AUTO: 2.56 X10^6/UL (ref 4.38–5.82)
RBC # BLD AUTO: 3.13 X10^6/UL (ref 4.38–5.82)
SEG#(MANUAL): 7.54 X10^3/UL (ref 1.8–6.8)
SEG#(MANUAL): 9.2 X10^3/UL (ref 1.8–6.8)
SEG#(MANUAL): 9.2 X10^3/UL (ref 1.8–6.8)
SEGS% (MANUAL): 80 % (ref 42–75)
SEGS% (MANUAL): 82 % (ref 42–75)
SEGS% (MANUAL): 86 % (ref 42–75)

## 2018-07-12 PROCEDURE — 30233N1 TRANSFUSION OF NONAUTOLOGOUS RED BLOOD CELLS INTO PERIPHERAL VEIN, PERCUTANEOUS APPROACH: ICD-10-PCS | Performed by: INTERNAL MEDICINE

## 2018-07-12 RX ADMIN — SPIRONOLACTONE SCH MG: 25 TABLET ORAL at 08:12

## 2018-07-12 RX ADMIN — LISINOPRIL SCH MG: 20 TABLET ORAL at 07:13

## 2018-07-12 RX ADMIN — OXYCODONE HYDROCHLORIDE PRN MG: 5 TABLET ORAL at 12:19

## 2018-07-12 RX ADMIN — Medication SCH MG: at 08:12

## 2018-07-12 RX ADMIN — SODIUM CHLORIDE, PRESERVATIVE FREE SCH ML: 5 INJECTION INTRAVENOUS at 08:12

## 2018-07-12 RX ADMIN — TAMSULOSIN HYDROCHLORIDE SCH MG: 0.4 CAPSULE ORAL at 08:11

## 2018-07-12 RX ADMIN — OXYCODONE HYDROCHLORIDE PRN MG: 5 TABLET ORAL at 08:12

## 2018-07-12 RX ADMIN — FOLIC ACID SCH MG: 1 TABLET ORAL at 08:12

## 2018-07-12 RX ADMIN — FUROSEMIDE SCH MG: 10 INJECTION, SOLUTION INTRAMUSCULAR; INTRAVENOUS at 08:11

## 2018-07-12 RX ADMIN — OXYCODONE HYDROCHLORIDE PRN MG: 5 TABLET ORAL at 04:07

## 2018-07-17 ENCOUNTER — HOSPITAL ENCOUNTER (OUTPATIENT)
Dept: HOSPITAL 8 - RAD | Age: 78
Discharge: HOME | End: 2018-07-17
Attending: INTERNAL MEDICINE
Payer: MEDICARE

## 2018-07-17 DIAGNOSIS — M79.89: Primary | ICD-10-CM

## 2018-07-17 DIAGNOSIS — R60.9: ICD-10-CM

## 2018-08-13 ENCOUNTER — HOSPITAL ENCOUNTER (EMERGENCY)
Dept: HOSPITAL 8 - ED | Age: 78
Discharge: HOME | End: 2018-08-13
Payer: MEDICARE

## 2018-08-13 VITALS — HEIGHT: 63 IN | WEIGHT: 104.06 LBS | BODY MASS INDEX: 18.44 KG/M2

## 2018-08-13 VITALS — DIASTOLIC BLOOD PRESSURE: 86 MMHG | SYSTOLIC BLOOD PRESSURE: 171 MMHG
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
## 2018-08-13 DIAGNOSIS — S00.81XA: ICD-10-CM
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
## 2018-08-13 DIAGNOSIS — S00.31XA: Primary | ICD-10-CM

## 2018-08-13 DIAGNOSIS — S09.90XA: ICD-10-CM

## 2018-08-13 LAB
<PLATELET ESTIMATE>: (no result)
<PLT MORPHOLOGY>: (no result)
ALBUMIN SERPL-MCNC: 3.3 G/DL (ref 3.4–5)
ALP SERPL-CCNC: 63 U/L (ref 45–117)
ALT SERPL-CCNC: 20 U/L (ref 12–78)
ANION GAP SERPL CALC-SCNC: 8 MMOL/L (ref 5–15)
ANISOCYTOSIS BLD QL SMEAR: (no result)
BAND#(MANUAL): 0.09 X10^3/UL
BILIRUB SERPL-MCNC: 0.3 MG/DL (ref 0.2–1)
CALCIUM SERPL-MCNC: 8.8 MG/DL (ref 8.5–10.1)
CHLORIDE SERPL-SCNC: 113 MMOL/L (ref 98–107)
CK SERPL-CCNC: 125 U/L (ref 39–308)
CREAT SERPL-MCNC: 1.02 MG/DL (ref 0.7–1.3)
CULTURE INDICATED?: NO
ERYTHROCYTE [DISTWIDTH] IN BLOOD BY AUTOMATED COUNT: 21.4 % (ref 9.4–14.8)
LYMPH#(MANUAL): 2.26 X10^3/UL (ref 1–3.4)
LYMPHS% (MANUAL): 24 % (ref 22–44)
MCH RBC QN AUTO: 28.2 PG (ref 27.5–34.5)
MCHC RBC AUTO-ENTMCNC: 32.6 G/DL (ref 33.2–36.2)
MCV RBC AUTO: 86.6 FL (ref 81–97)
MD: YES
MICROSCOPIC: (no result)
MONOS#(MANUAL): 0.38 X10^3/UL (ref 0.3–2.7)
MONOS% (MANUAL): 4 % (ref 2–9)
NEUTS BAND NFR BLD: 1 % (ref 0–7)
OVALOCYTES BLD QL SMEAR: (no result)
PLATELET # BLD AUTO: 442 X10^3/UL (ref 130–400)
PMV BLD AUTO: 8.6 FL (ref 7.4–10.4)
PROT SERPL-MCNC: 7.2 G/DL (ref 6.4–8.2)
RBC # BLD AUTO: 3.86 X10^6/UL (ref 4.38–5.82)
SEG#(MANUAL): 6.67 X10^3/UL (ref 1.8–6.8)
SEGS% (MANUAL): 71 % (ref 42–75)

## 2018-08-13 PROCEDURE — 90471 IMMUNIZATION ADMIN: CPT

## 2018-08-13 PROCEDURE — 90715 TDAP VACCINE 7 YRS/> IM: CPT

## 2018-08-13 PROCEDURE — 93005 ELECTROCARDIOGRAM TRACING: CPT

## 2018-08-13 PROCEDURE — 85025 COMPLETE CBC W/AUTO DIFF WBC: CPT

## 2018-08-13 PROCEDURE — 72020 X-RAY EXAM OF SPINE 1 VIEW: CPT

## 2018-08-13 PROCEDURE — 80053 COMPREHEN METABOLIC PANEL: CPT

## 2018-08-13 PROCEDURE — 72050 X-RAY EXAM NECK SPINE 4/5VWS: CPT

## 2018-08-13 PROCEDURE — 81003 URINALYSIS AUTO W/O SCOPE: CPT

## 2018-08-13 PROCEDURE — 36415 COLL VENOUS BLD VENIPUNCTURE: CPT

## 2018-08-13 PROCEDURE — 71045 X-RAY EXAM CHEST 1 VIEW: CPT

## 2018-08-13 PROCEDURE — 82550 ASSAY OF CK (CPK): CPT

## 2018-08-13 PROCEDURE — 70450 CT HEAD/BRAIN W/O DYE: CPT

## 2018-09-06 ENCOUNTER — APPOINTMENT (OUTPATIENT)
Dept: PULMONOLOGY | Facility: HOSPICE | Age: 78
End: 2018-09-06
Payer: MEDICARE

## 2018-09-06 ENCOUNTER — HOSPITAL ENCOUNTER (OUTPATIENT)
Dept: RADIOLOGY | Facility: MEDICAL CENTER | Age: 78
End: 2018-09-06

## 2018-09-25 ENCOUNTER — TELEPHONE (OUTPATIENT)
Dept: RADIOLOGY | Facility: IMAGING CENTER | Age: 78
End: 2018-09-25

## 2018-10-01 ENCOUNTER — TELEPHONE (OUTPATIENT)
Dept: RADIOLOGY | Facility: IMAGING CENTER | Age: 78
End: 2018-10-01

## 2018-10-01 DIAGNOSIS — R93.89 ABNORMAL CHEST X-RAY: ICD-10-CM

## 2018-10-04 ENCOUNTER — APPOINTMENT (OUTPATIENT)
Dept: PULMONOLOGY | Facility: HOSPICE | Age: 78
End: 2018-10-04
Payer: MEDICARE

## 2018-10-04 ENCOUNTER — APPOINTMENT (OUTPATIENT)
Dept: RADIOLOGY | Facility: IMAGING CENTER | Age: 78
End: 2018-10-04
Payer: MEDICARE

## 2018-11-05 ENCOUNTER — TELEPHONE (OUTPATIENT)
Dept: PULMONOLOGY | Facility: HOSPICE | Age: 78
End: 2018-11-05

## 2018-11-05 DIAGNOSIS — R06.02 SOB (SHORTNESS OF BREATH): ICD-10-CM

## 2018-11-07 ENCOUNTER — APPOINTMENT (OUTPATIENT)
Dept: PULMONOLOGY | Facility: HOSPICE | Age: 78
End: 2018-11-07
Payer: MEDICARE

## 2018-11-07 ENCOUNTER — APPOINTMENT (OUTPATIENT)
Dept: RADIOLOGY | Facility: IMAGING CENTER | Age: 78
End: 2018-11-07
Payer: MEDICARE

## 2019-11-22 ENCOUNTER — HOSPITAL ENCOUNTER (INPATIENT)
Dept: HOSPITAL 8 - ED | Age: 79
LOS: 5 days | Discharge: HOME HEALTH | DRG: 291 | End: 2019-11-27
Attending: INTERNAL MEDICINE | Admitting: INTERNAL MEDICINE
Payer: MEDICARE

## 2019-11-22 VITALS — DIASTOLIC BLOOD PRESSURE: 65 MMHG | SYSTOLIC BLOOD PRESSURE: 165 MMHG

## 2019-11-22 VITALS — WEIGHT: 98.77 LBS | BODY MASS INDEX: 17.5 KG/M2 | HEIGHT: 63 IN

## 2019-11-22 DIAGNOSIS — Z99.81: ICD-10-CM

## 2019-11-22 DIAGNOSIS — Z82.49: ICD-10-CM

## 2019-11-22 DIAGNOSIS — N18.9: ICD-10-CM

## 2019-11-22 DIAGNOSIS — E87.1: ICD-10-CM

## 2019-11-22 DIAGNOSIS — N40.1: ICD-10-CM

## 2019-11-22 DIAGNOSIS — J96.01: ICD-10-CM

## 2019-11-22 DIAGNOSIS — J44.1: ICD-10-CM

## 2019-11-22 DIAGNOSIS — I13.0: Primary | ICD-10-CM

## 2019-11-22 DIAGNOSIS — G89.29: ICD-10-CM

## 2019-11-22 DIAGNOSIS — Z95.0: ICD-10-CM

## 2019-11-22 DIAGNOSIS — R33.8: ICD-10-CM

## 2019-11-22 DIAGNOSIS — E43: ICD-10-CM

## 2019-11-22 DIAGNOSIS — D64.9: ICD-10-CM

## 2019-11-22 DIAGNOSIS — I08.1: ICD-10-CM

## 2019-11-22 DIAGNOSIS — E83.42: ICD-10-CM

## 2019-11-22 DIAGNOSIS — R29.6: ICD-10-CM

## 2019-11-22 DIAGNOSIS — I27.20: ICD-10-CM

## 2019-11-22 DIAGNOSIS — Z87.891: ICD-10-CM

## 2019-11-22 DIAGNOSIS — K21.9: ICD-10-CM

## 2019-11-22 DIAGNOSIS — Z86.711: ICD-10-CM

## 2019-11-22 DIAGNOSIS — D68.59: ICD-10-CM

## 2019-11-22 DIAGNOSIS — N17.9: ICD-10-CM

## 2019-11-22 DIAGNOSIS — Z91.81: ICD-10-CM

## 2019-11-22 LAB
<PLATELET ESTIMATE>: ADEQUATE
<PLT MORPHOLOGY>: (no result)
ALBUMIN SERPL-MCNC: 3.9 G/DL (ref 3.4–5)
ALP SERPL-CCNC: 71 U/L (ref 45–117)
ALT SERPL-CCNC: 35 U/L (ref 12–78)
ANION GAP SERPL CALC-SCNC: 9 MMOL/L (ref 5–15)
ANISOCYTOSIS BLD QL SMEAR: (no result)
APTT BLD: 24 SECONDS (ref 25–31)
BAND#(MANUAL): 0.06 X10^3/UL
BASOPHILS NFR BLD MANUAL: 2 % (ref 0–1)
BASOS#(MANUAL): 0.12 X10^3/UL (ref 0–0.1)
BILIRUB SERPL-MCNC: 1 MG/DL (ref 0.2–1)
CALCIUM SERPL-MCNC: 9.1 MG/DL (ref 8.5–10.1)
CHLORIDE SERPL-SCNC: 101 MMOL/L (ref 98–107)
CREAT SERPL-MCNC: 2.45 MG/DL (ref 0.7–1.3)
ERYTHROCYTE [DISTWIDTH] IN BLOOD BY AUTOMATED COUNT: 19.3 % (ref 9.4–14.8)
HYPOCHROMIA BLD QL SMEAR: (no result)
INR PPP: 1.11 (ref 0.93–1.1)
LYMPH#(MANUAL): 0.24 X10^3/UL (ref 1–3.4)
LYMPHS% (MANUAL): 4 % (ref 22–44)
MCH RBC QN AUTO: 27.5 PG (ref 27.5–34.5)
MCHC RBC AUTO-ENTMCNC: 31.9 G/DL (ref 33.2–36.2)
MCV RBC AUTO: 86.2 FL (ref 81–97)
MD: YES
MONOS#(MANUAL): 0.54 X10^3/UL (ref 0.3–2.7)
MONOS% (MANUAL): 9 % (ref 2–9)
MYELOCYTES# (MANUAL): 0.06 X10^3/UL (ref 0–0)
MYELOCYTES% (MANUAL): 1 % (ref 0–0)
NEUTS BAND NFR BLD: 1 % (ref 0–7)
OVALOCYTES BLD QL SMEAR: (no result)
PLATELET # BLD AUTO: 268 X10^3/UL (ref 130–400)
PMV BLD AUTO: 9.1 FL (ref 7.4–10.4)
PROT SERPL-MCNC: 7.8 G/DL (ref 6.4–8.2)
PROTHROMBIN TIME: 11.6 SECONDS (ref 9.6–11.5)
RBC # BLD AUTO: 3.53 X10^6/UL (ref 4.38–5.82)
SEG#(MANUAL): 4.98 X10^3/UL (ref 1.8–6.8)
SEGS% (MANUAL): 83 % (ref 42–75)
TROPONIN I SERPL-MCNC: < 0.015 NG/ML (ref 0–0.04)

## 2019-11-22 PROCEDURE — 80048 BASIC METABOLIC PNL TOTAL CA: CPT

## 2019-11-22 PROCEDURE — 80053 COMPREHEN METABOLIC PANEL: CPT

## 2019-11-22 PROCEDURE — 99285 EMERGENCY DEPT VISIT HI MDM: CPT

## 2019-11-22 PROCEDURE — 84484 ASSAY OF TROPONIN QUANT: CPT

## 2019-11-22 PROCEDURE — 82728 ASSAY OF FERRITIN: CPT

## 2019-11-22 PROCEDURE — 83550 IRON BINDING TEST: CPT

## 2019-11-22 PROCEDURE — 83735 ASSAY OF MAGNESIUM: CPT

## 2019-11-22 PROCEDURE — 94640 AIRWAY INHALATION TREATMENT: CPT

## 2019-11-22 PROCEDURE — 96374 THER/PROPH/DIAG INJ IV PUSH: CPT

## 2019-11-22 PROCEDURE — 71045 X-RAY EXAM CHEST 1 VIEW: CPT

## 2019-11-22 PROCEDURE — 36415 COLL VENOUS BLD VENIPUNCTURE: CPT

## 2019-11-22 PROCEDURE — 85730 THROMBOPLASTIN TIME PARTIAL: CPT

## 2019-11-22 PROCEDURE — 84443 ASSAY THYROID STIM HORMONE: CPT

## 2019-11-22 PROCEDURE — 85610 PROTHROMBIN TIME: CPT

## 2019-11-22 PROCEDURE — 83540 ASSAY OF IRON: CPT

## 2019-11-22 PROCEDURE — 82607 VITAMIN B-12: CPT

## 2019-11-22 PROCEDURE — 84100 ASSAY OF PHOSPHORUS: CPT

## 2019-11-22 PROCEDURE — 85025 COMPLETE CBC W/AUTO DIFF WBC: CPT

## 2019-11-22 PROCEDURE — 83880 ASSAY OF NATRIURETIC PEPTIDE: CPT

## 2019-11-22 PROCEDURE — 93005 ELECTROCARDIOGRAM TRACING: CPT

## 2019-11-22 PROCEDURE — 93306 TTE W/DOPPLER COMPLETE: CPT

## 2019-11-22 RX ADMIN — TIZANIDINE SCH MG: 4 TABLET ORAL at 22:38

## 2019-11-22 RX ADMIN — SPIRONOLACTONE SCH MG: 25 TABLET ORAL at 22:38

## 2019-11-22 NOTE — NUR
pt ambulated to restroom with walker and oxygen after dsaying he had to use the 
restroom. this rn assisted pt to restrrom where he had a bout dinah diarrhea. pt 
reports he has been constipated and this was the first time he had bm in a 
couple of days.

## 2019-11-22 NOTE — NUR
CALL MADE TO SAM[place], PT'S EX-WIFE, AND SHE TOOK ALL OF PT'S BELONGINGS HOME WITH 
HER. PT LIVES WITH HIS EX-WIFE.

## 2019-11-22 NOTE — NUR
BREAK RN: DR QUINTANILLA AT BEDSIDE TO LISA PT.  79 YR OLD MALE HERE WITH C/O "RIGHT 
LEG OOZING." IT BEGAN ABOUT A WEEK AGO.  PT HAS BEEN FEELING SHORT OF BREATH 
FOR MORE THAN A WEEK.  PT HAS ALWAYS SLEPT BLAKE SITTING UP.  "WE TRY TO KEEP 
HIS FEET ELEVATED.  HE DOESNT ALWAYS DO THAT" PT WITH SCATTERED ECHYMOSIS TO 
BODY, CR LEGS SWOLLEN, WEEPING.  SR PER MONITOR.

## 2019-11-23 VITALS — DIASTOLIC BLOOD PRESSURE: 66 MMHG | SYSTOLIC BLOOD PRESSURE: 153 MMHG

## 2019-11-23 VITALS — DIASTOLIC BLOOD PRESSURE: 75 MMHG | SYSTOLIC BLOOD PRESSURE: 154 MMHG

## 2019-11-23 VITALS — DIASTOLIC BLOOD PRESSURE: 67 MMHG | SYSTOLIC BLOOD PRESSURE: 134 MMHG

## 2019-11-23 VITALS — SYSTOLIC BLOOD PRESSURE: 126 MMHG | DIASTOLIC BLOOD PRESSURE: 64 MMHG

## 2019-11-23 VITALS — DIASTOLIC BLOOD PRESSURE: 70 MMHG | SYSTOLIC BLOOD PRESSURE: 175 MMHG

## 2019-11-23 LAB
<PLATELET ESTIMATE>: ADEQUATE
<PLT MORPHOLOGY>: (no result)
ANION GAP SERPL CALC-SCNC: 6 MMOL/L (ref 5–15)
ANISOCYTOSIS BLD QL SMEAR: (no result)
BASOPHILS NFR BLD MANUAL: 1 % (ref 0–1)
BASOS#(MANUAL): 0.05 X10^3/UL (ref 0–0.1)
CALCIUM SERPL-MCNC: 9 MG/DL (ref 8.5–10.1)
CHLORIDE SERPL-SCNC: 103 MMOL/L (ref 98–107)
CREAT SERPL-MCNC: 1.97 MG/DL (ref 0.7–1.3)
EOS#(MANUAL): 0.24 X10^3/UL (ref 0–0.4)
EOS% (MANUAL): 5 % (ref 1–7)
ERYTHROCYTE [DISTWIDTH] IN BLOOD BY AUTOMATED COUNT: 19.5 % (ref 9.4–14.8)
HYPOCHROMIA BLD QL SMEAR: (no result)
LYMPH#(MANUAL): 0.67 X10^3/UL (ref 1–3.4)
LYMPHS% (MANUAL): 14 % (ref 22–44)
MCH RBC QN AUTO: 27.4 PG (ref 27.5–34.5)
MCHC RBC AUTO-ENTMCNC: 31.8 G/DL (ref 33.2–36.2)
MCV RBC AUTO: 85.9 FL (ref 81–97)
MD: YES
MONOS#(MANUAL): 0.43 X10^3/UL (ref 0.3–2.7)
MONOS% (MANUAL): 9 % (ref 2–9)
OVALOCYTES BLD QL SMEAR: (no result)
PLATELET # BLD AUTO: 238 X10^3/UL (ref 130–400)
PMV BLD AUTO: 9.1 FL (ref 7.4–10.4)
RBC # BLD AUTO: 3.26 X10^6/UL (ref 4.38–5.82)
SEG#(MANUAL): 3.41 X10^3/UL (ref 1.8–6.8)
SEGS% (MANUAL): 71 % (ref 42–75)
TROPONIN I SERPL-MCNC: < 0.015 NG/ML (ref 0–0.04)

## 2019-11-23 RX ADMIN — IPRATROPIUM BROMIDE SCH MG: 0.5 SOLUTION RESPIRATORY (INHALATION) at 07:00

## 2019-11-23 RX ADMIN — FUROSEMIDE SCH MG: 10 INJECTION, SOLUTION INTRAVENOUS at 17:13

## 2019-11-23 RX ADMIN — IPRATROPIUM BROMIDE SCH MG: 0.5 SOLUTION RESPIRATORY (INHALATION) at 13:00

## 2019-11-23 RX ADMIN — OXYCODONE HYDROCHLORIDE PRN MG: 5 TABLET ORAL at 20:11

## 2019-11-23 RX ADMIN — FUROSEMIDE SCH MG: 10 INJECTION, SOLUTION INTRAVENOUS at 10:25

## 2019-11-23 RX ADMIN — TIZANIDINE SCH MG: 4 TABLET ORAL at 20:09

## 2019-11-23 RX ADMIN — SPIRONOLACTONE SCH MG: 25 TABLET ORAL at 10:21

## 2019-11-23 RX ADMIN — IPRATROPIUM BROMIDE SCH MG: 0.5 SOLUTION RESPIRATORY (INHALATION) at 20:17

## 2019-11-23 RX ADMIN — OXYCODONE HYDROCHLORIDE PRN MG: 5 TABLET ORAL at 10:25

## 2019-11-23 RX ADMIN — Medication SCH MG: at 05:40

## 2019-11-23 RX ADMIN — HEPARIN SODIUM SCH UNITS: 5000 INJECTION, SOLUTION INTRAVENOUS; SUBCUTANEOUS at 17:13

## 2019-11-23 RX ADMIN — HEPARIN SODIUM SCH UNITS: 5000 INJECTION, SOLUTION INTRAVENOUS; SUBCUTANEOUS at 10:25

## 2019-11-23 RX ADMIN — SPIRONOLACTONE SCH MG: 25 TABLET ORAL at 20:10

## 2019-11-23 RX ADMIN — OXYCODONE HYDROCHLORIDE PRN MG: 5 TABLET ORAL at 05:57

## 2019-11-24 VITALS — SYSTOLIC BLOOD PRESSURE: 121 MMHG | DIASTOLIC BLOOD PRESSURE: 57 MMHG

## 2019-11-24 VITALS — SYSTOLIC BLOOD PRESSURE: 164 MMHG | DIASTOLIC BLOOD PRESSURE: 66 MMHG

## 2019-11-24 VITALS — SYSTOLIC BLOOD PRESSURE: 149 MMHG | DIASTOLIC BLOOD PRESSURE: 68 MMHG

## 2019-11-24 VITALS — SYSTOLIC BLOOD PRESSURE: 143 MMHG | DIASTOLIC BLOOD PRESSURE: 70 MMHG

## 2019-11-24 VITALS — DIASTOLIC BLOOD PRESSURE: 55 MMHG | SYSTOLIC BLOOD PRESSURE: 109 MMHG

## 2019-11-24 LAB
% IRON SATURATION: 10 % (ref 20–55)
ANION GAP SERPL CALC-SCNC: 5 MMOL/L (ref 5–15)
BASOPHILS # BLD AUTO: 0.03 X10^3/UL (ref 0–0.1)
BASOPHILS NFR BLD AUTO: 1 % (ref 0–1)
CALCIUM SERPL-MCNC: 8.9 MG/DL (ref 8.5–10.1)
CHLORIDE SERPL-SCNC: 98 MMOL/L (ref 98–107)
CREAT SERPL-MCNC: 1.52 MG/DL (ref 0.7–1.3)
EOSINOPHIL # BLD AUTO: 0.31 X10^3/UL (ref 0–0.4)
EOSINOPHIL NFR BLD AUTO: 6 % (ref 1–7)
ERYTHROCYTE [DISTWIDTH] IN BLOOD BY AUTOMATED COUNT: 19.9 % (ref 9.4–14.8)
IRON LEVEL: 34 MCG/DL (ref 65–175)
LYMPHOCYTES # BLD AUTO: 1.04 X10^3/UL (ref 1–3.4)
LYMPHOCYTES NFR BLD AUTO: 19 % (ref 22–44)
MCH RBC QN AUTO: 27.5 PG (ref 27.5–34.5)
MCHC RBC AUTO-ENTMCNC: 32.1 G/DL (ref 33.2–36.2)
MCV RBC AUTO: 85.6 FL (ref 81–97)
MD: (no result)
MONOCYTES # BLD AUTO: 0.76 X10^3/UL (ref 0.2–0.8)
MONOCYTES NFR BLD AUTO: 14 % (ref 2–9)
NEUTROPHILS # BLD AUTO: 3.34 X10^3/UL (ref 1.8–6.8)
NEUTROPHILS NFR BLD AUTO: 61 % (ref 42–75)
PLATELET # BLD AUTO: 236 X10^3/UL (ref 130–400)
PMV BLD AUTO: 9.4 FL (ref 7.4–10.4)
RBC # BLD AUTO: 3.22 X10^6/UL (ref 4.38–5.82)
TIBC SERPL-MCNC: 354 MCG/DL (ref 250–450)

## 2019-11-24 RX ADMIN — OXYCODONE HYDROCHLORIDE PRN MG: 5 TABLET ORAL at 10:06

## 2019-11-24 RX ADMIN — OXYCODONE HYDROCHLORIDE PRN MG: 5 TABLET ORAL at 16:56

## 2019-11-24 RX ADMIN — Medication SCH MG: at 06:09

## 2019-11-24 RX ADMIN — OXYCODONE HYDROCHLORIDE PRN MG: 5 TABLET ORAL at 04:08

## 2019-11-24 RX ADMIN — FUROSEMIDE SCH MG: 10 INJECTION, SOLUTION INTRAVENOUS at 16:55

## 2019-11-24 RX ADMIN — HEPARIN SODIUM SCH UNITS: 5000 INJECTION, SOLUTION INTRAVENOUS; SUBCUTANEOUS at 01:59

## 2019-11-24 RX ADMIN — MAGNESIUM OXIDE SCH MG: 400 TABLET ORAL at 21:11

## 2019-11-24 RX ADMIN — HEPARIN SODIUM SCH UNITS: 5000 INJECTION, SOLUTION INTRAVENOUS; SUBCUTANEOUS at 23:05

## 2019-11-24 RX ADMIN — IPRATROPIUM BROMIDE SCH MG: 0.5 SOLUTION RESPIRATORY (INHALATION) at 09:00

## 2019-11-24 RX ADMIN — IPRATROPIUM BROMIDE SCH MG: 0.5 SOLUTION RESPIRATORY (INHALATION) at 15:30

## 2019-11-24 RX ADMIN — HEPARIN SODIUM SCH UNITS: 5000 INJECTION, SOLUTION INTRAVENOUS; SUBCUTANEOUS at 10:00

## 2019-11-24 RX ADMIN — IPRATROPIUM BROMIDE SCH MG: 0.5 SOLUTION RESPIRATORY (INHALATION) at 03:00

## 2019-11-24 RX ADMIN — FUROSEMIDE SCH MG: 10 INJECTION, SOLUTION INTRAVENOUS at 08:50

## 2019-11-24 RX ADMIN — SPIRONOLACTONE SCH MG: 25 TABLET ORAL at 21:12

## 2019-11-24 RX ADMIN — TIZANIDINE SCH MG: 4 TABLET ORAL at 21:11

## 2019-11-24 RX ADMIN — TAMSULOSIN HYDROCHLORIDE SCH MG: 0.4 CAPSULE ORAL at 08:51

## 2019-11-24 RX ADMIN — SPIRONOLACTONE SCH MG: 25 TABLET ORAL at 08:51

## 2019-11-25 VITALS — SYSTOLIC BLOOD PRESSURE: 161 MMHG | DIASTOLIC BLOOD PRESSURE: 68 MMHG

## 2019-11-25 VITALS — DIASTOLIC BLOOD PRESSURE: 73 MMHG | SYSTOLIC BLOOD PRESSURE: 152 MMHG

## 2019-11-25 VITALS — SYSTOLIC BLOOD PRESSURE: 101 MMHG | DIASTOLIC BLOOD PRESSURE: 52 MMHG

## 2019-11-25 VITALS — DIASTOLIC BLOOD PRESSURE: 73 MMHG | SYSTOLIC BLOOD PRESSURE: 143 MMHG

## 2019-11-25 VITALS — DIASTOLIC BLOOD PRESSURE: 63 MMHG | SYSTOLIC BLOOD PRESSURE: 136 MMHG

## 2019-11-25 LAB
<PLATELET ESTIMATE>: ADEQUATE
<PLT MORPHOLOGY>: (no result)
ANION GAP SERPL CALC-SCNC: 5 MMOL/L (ref 5–15)
ANISOCYTOSIS BLD QL SMEAR: (no result)
CALCIUM SERPL-MCNC: 9.3 MG/DL (ref 8.5–10.1)
CHLORIDE SERPL-SCNC: 96 MMOL/L (ref 98–107)
CREAT SERPL-MCNC: 1.32 MG/DL (ref 0.7–1.3)
EOS#(MANUAL): 0.13 X10^3/UL (ref 0–0.4)
EOS% (MANUAL): 2 % (ref 1–7)
ERYTHROCYTE [DISTWIDTH] IN BLOOD BY AUTOMATED COUNT: 20.3 % (ref 9.4–14.8)
HYPOCHROMIA BLD QL SMEAR: (no result)
LYMPH#(MANUAL): 1.94 X10^3/UL (ref 1–3.4)
LYMPHS% (MANUAL): 29 % (ref 22–44)
MCH RBC QN AUTO: 27.5 PG (ref 27.5–34.5)
MCHC RBC AUTO-ENTMCNC: 31.8 G/DL (ref 33.2–36.2)
MCV RBC AUTO: 86.4 FL (ref 81–97)
MD: YES
MONOS#(MANUAL): 0.87 X10^3/UL (ref 0.3–2.7)
MONOS% (MANUAL): 13 % (ref 2–9)
OVALOCYTES BLD QL SMEAR: (no result)
PLATELET # BLD AUTO: 272 X10^3/UL (ref 130–400)
PMV BLD AUTO: 9.2 FL (ref 7.4–10.4)
RBC # BLD AUTO: 3.85 X10^6/UL (ref 4.38–5.82)
SEG#(MANUAL): 3.75 X10^3/UL (ref 1.8–6.8)
SEGS% (MANUAL): 56 % (ref 42–75)

## 2019-11-25 RX ADMIN — TIZANIDINE SCH MG: 4 TABLET ORAL at 21:11

## 2019-11-25 RX ADMIN — FUROSEMIDE SCH MG: 10 INJECTION, SOLUTION INTRAVENOUS at 17:27

## 2019-11-25 RX ADMIN — IPRATROPIUM BROMIDE SCH MG: 0.5 SOLUTION RESPIRATORY (INHALATION) at 09:34

## 2019-11-25 RX ADMIN — MAGNESIUM OXIDE SCH MG: 400 TABLET ORAL at 08:44

## 2019-11-25 RX ADMIN — OXYCODONE HYDROCHLORIDE PRN MG: 5 TABLET ORAL at 11:12

## 2019-11-25 RX ADMIN — TAMSULOSIN HYDROCHLORIDE SCH MG: 0.4 CAPSULE ORAL at 08:44

## 2019-11-25 RX ADMIN — SPIRONOLACTONE SCH MG: 25 TABLET ORAL at 21:11

## 2019-11-25 RX ADMIN — SPIRONOLACTONE SCH MG: 25 TABLET ORAL at 08:44

## 2019-11-25 RX ADMIN — OXYCODONE HYDROCHLORIDE PRN MG: 5 TABLET ORAL at 23:12

## 2019-11-25 RX ADMIN — HEPARIN SODIUM SCH UNITS: 5000 INJECTION, SOLUTION INTRAVENOUS; SUBCUTANEOUS at 23:30

## 2019-11-25 RX ADMIN — FUROSEMIDE SCH MG: 10 INJECTION, SOLUTION INTRAVENOUS at 08:44

## 2019-11-25 RX ADMIN — OXYCODONE HYDROCHLORIDE PRN MG: 5 TABLET ORAL at 04:57

## 2019-11-25 RX ADMIN — IPRATROPIUM BROMIDE SCH MG: 0.5 SOLUTION RESPIRATORY (INHALATION) at 20:03

## 2019-11-25 RX ADMIN — IPRATROPIUM BROMIDE SCH MG: 0.5 SOLUTION RESPIRATORY (INHALATION) at 14:21

## 2019-11-25 RX ADMIN — MAGNESIUM OXIDE SCH MG: 400 TABLET ORAL at 21:11

## 2019-11-25 RX ADMIN — Medication SCH MG: at 05:48

## 2019-11-25 RX ADMIN — IPRATROPIUM BROMIDE SCH MG: 0.5 SOLUTION RESPIRATORY (INHALATION) at 00:15

## 2019-11-25 RX ADMIN — IPRATROPIUM BROMIDE SCH MG: 0.5 SOLUTION RESPIRATORY (INHALATION) at 00:22

## 2019-11-25 RX ADMIN — HEPARIN SODIUM SCH UNITS: 5000 INJECTION, SOLUTION INTRAVENOUS; SUBCUTANEOUS at 11:11

## 2019-11-25 RX ADMIN — OXYCODONE HYDROCHLORIDE PRN MG: 5 TABLET ORAL at 17:15

## 2019-11-26 VITALS — SYSTOLIC BLOOD PRESSURE: 102 MMHG | DIASTOLIC BLOOD PRESSURE: 56 MMHG

## 2019-11-26 VITALS — SYSTOLIC BLOOD PRESSURE: 138 MMHG | DIASTOLIC BLOOD PRESSURE: 73 MMHG

## 2019-11-26 VITALS — DIASTOLIC BLOOD PRESSURE: 73 MMHG | SYSTOLIC BLOOD PRESSURE: 146 MMHG

## 2019-11-26 VITALS — SYSTOLIC BLOOD PRESSURE: 161 MMHG | DIASTOLIC BLOOD PRESSURE: 69 MMHG

## 2019-11-26 VITALS — DIASTOLIC BLOOD PRESSURE: 88 MMHG | SYSTOLIC BLOOD PRESSURE: 132 MMHG

## 2019-11-26 RX ADMIN — IPRATROPIUM BROMIDE SCH MG: 0.5 SOLUTION RESPIRATORY (INHALATION) at 20:31

## 2019-11-26 RX ADMIN — OXYCODONE HYDROCHLORIDE PRN MG: 5 TABLET ORAL at 17:31

## 2019-11-26 RX ADMIN — FUROSEMIDE SCH MG: 10 INJECTION, SOLUTION INTRAVENOUS at 08:13

## 2019-11-26 RX ADMIN — HEPARIN SODIUM SCH UNITS: 5000 INJECTION, SOLUTION INTRAVENOUS; SUBCUTANEOUS at 23:00

## 2019-11-26 RX ADMIN — SPIRONOLACTONE SCH MG: 25 TABLET ORAL at 08:13

## 2019-11-26 RX ADMIN — IPRATROPIUM BROMIDE SCH MG: 0.5 SOLUTION RESPIRATORY (INHALATION) at 09:40

## 2019-11-26 RX ADMIN — Medication SCH MG: at 05:15

## 2019-11-26 RX ADMIN — MAGNESIUM OXIDE SCH MG: 400 TABLET ORAL at 20:12

## 2019-11-26 RX ADMIN — OXYCODONE HYDROCHLORIDE PRN MG: 5 TABLET ORAL at 11:27

## 2019-11-26 RX ADMIN — TAMSULOSIN HYDROCHLORIDE SCH MG: 0.4 CAPSULE ORAL at 08:13

## 2019-11-26 RX ADMIN — HEPARIN SODIUM SCH UNITS: 5000 INJECTION, SOLUTION INTRAVENOUS; SUBCUTANEOUS at 11:27

## 2019-11-26 RX ADMIN — FUROSEMIDE SCH MG: 10 INJECTION, SOLUTION INTRAVENOUS at 17:25

## 2019-11-26 RX ADMIN — IPRATROPIUM BROMIDE SCH MG: 0.5 SOLUTION RESPIRATORY (INHALATION) at 02:07

## 2019-11-26 RX ADMIN — MAGNESIUM OXIDE SCH MG: 400 TABLET ORAL at 08:13

## 2019-11-26 RX ADMIN — SPIRONOLACTONE SCH MG: 25 TABLET ORAL at 20:12

## 2019-11-26 RX ADMIN — IPRATROPIUM BROMIDE SCH MG: 0.5 SOLUTION RESPIRATORY (INHALATION) at 14:59

## 2019-11-26 RX ADMIN — OXYCODONE HYDROCHLORIDE PRN MG: 5 TABLET ORAL at 05:16

## 2019-11-26 RX ADMIN — TIZANIDINE SCH MG: 4 TABLET ORAL at 20:12

## 2019-11-27 VITALS — SYSTOLIC BLOOD PRESSURE: 129 MMHG | DIASTOLIC BLOOD PRESSURE: 55 MMHG

## 2019-11-27 VITALS — DIASTOLIC BLOOD PRESSURE: 78 MMHG | SYSTOLIC BLOOD PRESSURE: 137 MMHG

## 2019-11-27 VITALS — SYSTOLIC BLOOD PRESSURE: 109 MMHG | DIASTOLIC BLOOD PRESSURE: 57 MMHG

## 2019-11-27 VITALS — SYSTOLIC BLOOD PRESSURE: 99 MMHG | DIASTOLIC BLOOD PRESSURE: 61 MMHG

## 2019-11-27 VITALS — SYSTOLIC BLOOD PRESSURE: 96 MMHG | DIASTOLIC BLOOD PRESSURE: 52 MMHG

## 2019-11-27 RX ADMIN — FUROSEMIDE SCH MG: 10 INJECTION, SOLUTION INTRAVENOUS at 09:31

## 2019-11-27 RX ADMIN — MAGNESIUM OXIDE SCH MG: 400 TABLET ORAL at 09:31

## 2019-11-27 RX ADMIN — TAMSULOSIN HYDROCHLORIDE SCH MG: 0.4 CAPSULE ORAL at 09:31

## 2019-11-27 RX ADMIN — OXYCODONE HYDROCHLORIDE PRN MG: 5 TABLET ORAL at 03:23

## 2019-11-27 RX ADMIN — IPRATROPIUM BROMIDE SCH MG: 0.5 SOLUTION RESPIRATORY (INHALATION) at 02:37

## 2019-11-27 RX ADMIN — SPIRONOLACTONE SCH MG: 25 TABLET ORAL at 09:31

## 2019-11-27 RX ADMIN — IPRATROPIUM BROMIDE SCH MG: 0.5 SOLUTION RESPIRATORY (INHALATION) at 07:30

## 2019-11-27 RX ADMIN — HEPARIN SODIUM SCH UNITS: 5000 INJECTION, SOLUTION INTRAVENOUS; SUBCUTANEOUS at 12:59

## 2019-11-27 RX ADMIN — Medication SCH MG: at 09:31

## 2019-11-27 RX ADMIN — OXYCODONE HYDROCHLORIDE PRN MG: 5 TABLET ORAL at 10:42

## 2019-12-09 ENCOUNTER — HOSPITAL ENCOUNTER (EMERGENCY)
Dept: HOSPITAL 8 - ED | Age: 79
Discharge: HOME | End: 2019-12-09
Payer: MEDICARE

## 2019-12-09 VITALS — HEIGHT: 63 IN | BODY MASS INDEX: 17.54 KG/M2 | WEIGHT: 98.99 LBS

## 2019-12-09 VITALS — SYSTOLIC BLOOD PRESSURE: 156 MMHG | DIASTOLIC BLOOD PRESSURE: 65 MMHG

## 2019-12-09 DIAGNOSIS — Z87.891: ICD-10-CM

## 2019-12-09 DIAGNOSIS — J43.9: ICD-10-CM

## 2019-12-09 DIAGNOSIS — Z46.6: Primary | ICD-10-CM

## 2019-12-09 DIAGNOSIS — I11.0: ICD-10-CM

## 2019-12-09 PROCEDURE — 99283 EMERGENCY DEPT VISIT LOW MDM: CPT

## 2019-12-09 NOTE — NUR
in room to discharge pt. this rn apologized for the wait.  pt stated "well it's 
a bummer." pt asked this rn's name. name provided.

## 2019-12-09 NOTE — NUR
late note 1130-pt pereira removed. pt had urine output of 70 ml. md made aware 
and pt up for recheck.

## 2019-12-09 NOTE — NUR
TRIAGE RN NOTE: PT'S SPO2 83% ON ROOM AIR; OXYGEN APPLIED IN TRIAGE AT 4L / 
MIN, SPO2 92% ON 4L NC. PT DENIES SOB/CP.

## 2020-12-08 NOTE — PROGRESS NOTES
Subjective:   Dewey Burton is a 76 y.o. male with past medical history significant for hyperlipidemia who was recently admitted to [place] and diagnosed with acute PE was referred to cardiology clinic due to ongoing presyncope. He was seen in clinic in March 2017. His presyncope was thought to be secondary to his PE. After his last visit he stopped his muscle relaxant and since then his presyncopal symptoms completely resolved.    His main concern today is lower extremity edema. His Lasix was discontinued at his last visit as his edema was thought to be dependent in nature. In the past couple of months he has noticed that his swelling has returned. He does note that his edema is improved and he first wakes up in the morning. He denies any PND. He has stable orthopnea for the past 2 years or longer. He does not add salt to his meals.    No chest discomfort or dyspnea. Patient underwent an event monitor in April which showed short runs of SVT and he was started on metoprolol. Since being on metoprolol he denies any recurrent palpitations.       Past medical history  Hyperlipidemia  Acute PE (early 2017)  Mild carotid stenosis    History reviewed. No pertinent past surgical history.     History reviewed. No pertinent family history.  History   Smoking status   • Former Smoker -- 1.50 packs/day   • Types: Cigarettes   • Quit date: 12/01/2012   Smokeless tobacco   • Never Used     Patient smoked 1-1/2 packs per day for 58 years, quit 2012. Drinks about 3 cans of beer per day. No history of recreational drug use.    Allergies   Allergen Reactions   • Lorazepam      STATES PARANOIA, HALLUCINATIONS     Outpatient Encounter Prescriptions as of 6/6/2017   Medication Sig Dispense Refill   • tamsulosin (FLOMAX) 0.4 MG capsule TK ONE C PO QD  5   • aspirin EC (ECOTRIN) 81 MG Tablet Delayed Response Take 81 mg by mouth every day.     • ondansetron (ZOFRAN ODT) 4 MG TABLET DISPERSIBLE Take 4 mg by mouth every 8 hours as needed  "for Nausea/Vomiting. Indications: n/v     • metoprolol (LOPRESSOR) 25 MG Tab Take 1 Tab by mouth 2 times a day. 60 Tab 6   • atorvastatin (LIPITOR) 20 MG Tab Take 20 mg by mouth every evening.     • tiotropium (SPIRIVA) 18 MCG Cap Inhale 18 mcg by mouth every day.     • apixaban (ELIQUIS) 5mg Tab Take 5 mg by mouth 2 Times a Day.     • meloxicam (MOBIC) 15 MG tablet Take 15 mg by mouth 1 time daily as needed for Moderate Pain.     • cetirizine (ZYRTEC) 10 MG Tab Take 10 mg by mouth every day.     • Ibuprofen-Diphenhydramine Cit (ADVIL PM) 200-38 MG Tab Take 3 Tabs by mouth every bedtime.     • omeprazole (PRILOSEC) 20 MG delayed-release capsule Take 20 mg by mouth 2 times a day.     • Tiotropium Bromide Monohydrate (SPIRIVA HANDIHALER INH) Inhale  by mouth.     • doxycycline (MONODOX) 100 MG capsule TK ONE C PO BID  0   • Oxycodone HCl 20 MG Tab Take 1 Tab by mouth 4 times a day as needed (PAIN).       No facility-administered encounter medications on file as of 6/6/2017.     Review of Systems   Constitutional: Negative for malaise/fatigue.   Respiratory: Negative for shortness of breath  Cardiovascular: Negative for palpitations. Negative for chest pain and PND.  positive for lower extremity edema and orthopnea.  Gastrointestinal: Negative for abdominal pain.   Musculoskeletal: Negative for falls.   Skin: Negative.    Neurological: Negative for dizziness. Negative for loss of consciousness and headaches.   Endo/Heme/Allergies: Does not bruise/bleed easily.   Psychiatric/Behavioral: Negative for depression.   All other systems reviewed and are negative.       Objective:   /70 mmHg  Pulse 74  Ht 1.626 m (5' 4\")  Wt 49.896 kg (110 lb)  BMI 18.87 kg/m2  SpO2 98%    Physical Exam   Constitutional: He is oriented to person, place, and time. No distress.   HENT:   Head: Normocephalic and atraumatic.   Eyes: Conjunctivae are normal.   Neck: Normal range of motion. Neck supple. No JVD present.   Cardiovascular: " Normal rate, regular rhythm and normal heart sounds.  Exam reveals no gallop and no friction rub.    No murmur heard.  Pulmonary/Chest: Effort normal and breath sounds normal. No respiratory distress. He has no wheezes. He has no rales.   Abdominal: Soft. There is no tenderness.   Musculoskeletal: He exhibits 1+ bilateral edema.   Neurological: He is alert and oriented to person, place, and time.   Skin: Skin is warm and dry. He is not diaphoretic.   Psychiatric: He has a normal mood and affect.   Nursing note and vitals reviewed.    Labs from [place] reviewed. Initially CK was significantly elevated however close to normal at the time of discharge. Creatinine normal at the time of discharge.    Cardiac duplex report from [place] reviewed. It showed mild atherosclerotic disease bilaterally.    Echocardiogram from [place] during recent admission also reviewed. It showed an ejection fraction of 60-65% with mild diastolic dysfunction. No major valvular pathology noted.    ECG from March 2017 showed normal sinus rhythm at 65 bpm, normal axis, normal intervals, no Q waves, no ST-T wave changes. ECG unchanged from baseline.    Ziopatch from April 2017 was reviewed - normal sinus rhythm, average HR 76 bpm.  7 runs of SVT. Longest was ~21 seconds. No associated symptoms.      Assessment:     1. Near syncope     2. Hyperlipidemia, unspecified hyperlipidemia type     3. Dependent edema         Medical Decision Making:  Today's Assessment / Status / Plan:     Near syncope - possibly secondary to PE versus muscle relaxant. Now resolved.    Short runs of SVT-noted on event monitor. Patient started on metoprolol and his palpitations have now resolved.    Lower extremity edema - likely dependent. Echocardiogram was relatively unremarkable with mild diastolic dysfunction. Patient was again advised to elevate his legs as much as possible. And use compression stockings as needed. I don't think he needs Lasix at this time  however patient was advised to do the lifestyle modifications mentioned above and if he keeps having swelling he should let us know.    Return to clinic in 1 year or prn.    Thank you for allowing me to participate in the care of this patient. Please do not hesitate to contact me with any questions.    Nikki Morataya MD  Cardiologist  [facility]   none